# Patient Record
Sex: MALE | Race: WHITE | Employment: FULL TIME | URBAN - METROPOLITAN AREA
[De-identification: names, ages, dates, MRNs, and addresses within clinical notes are randomized per-mention and may not be internally consistent; named-entity substitution may affect disease eponyms.]

---

## 2017-05-04 ENCOUNTER — NURSE ONLY (OUTPATIENT)
Dept: FAMILY MEDICINE CLINIC | Age: 52
End: 2017-05-04

## 2017-05-04 DIAGNOSIS — K21.9 GASTROESOPHAGEAL REFLUX DISEASE WITHOUT ESOPHAGITIS: Primary | ICD-10-CM

## 2017-11-09 PROBLEM — M50.20 HERNIATED DISC, CERVICAL: Status: ACTIVE | Noted: 2017-11-09

## 2017-12-27 ENCOUNTER — HOSPITAL ENCOUNTER (OUTPATIENT)
Age: 52
Setting detail: SPECIMEN
Discharge: HOME | End: 2017-12-27
Payer: COMMERCIAL

## 2018-01-02 LAB — SURGICAL PATHOLOGY REPORT: NORMAL

## 2018-01-08 PROBLEM — I65.23 BILATERAL CAROTID ARTERY STENOSIS: Status: ACTIVE | Noted: 2018-01-08

## 2020-07-02 ENCOUNTER — HOSPITAL ENCOUNTER (OUTPATIENT)
Age: 55
Setting detail: SPECIMEN
Discharge: HOME | End: 2020-07-02
Payer: COMMERCIAL

## 2020-07-02 LAB
ABSOLUTE EOS #: 0.15 K/UL (ref 0–0.44)
ABSOLUTE IMMATURE GRANULOCYTE: <0.03 K/UL (ref 0–0.3)
ABSOLUTE LYMPH #: 1.83 K/UL (ref 1.1–3.7)
ABSOLUTE MONO #: 0.67 K/UL (ref 0.1–1.2)
ALT SERPL-CCNC: 23 U/L (ref 5–41)
ANION GAP SERPL CALCULATED.3IONS-SCNC: 12 MMOL/L (ref 9–17)
AST SERPL-CCNC: 17 U/L
BASOPHILS # BLD: 1 % (ref 0–2)
BASOPHILS ABSOLUTE: 0.05 K/UL (ref 0–0.2)
BUN BLDV-MCNC: 18 MG/DL (ref 6–20)
BUN/CREAT BLD: NORMAL (ref 9–20)
CALCIUM SERPL-MCNC: 9.4 MG/DL (ref 8.6–10.4)
CHLORIDE BLD-SCNC: 106 MMOL/L (ref 98–107)
CHOLESTEROL/HDL RATIO: 3.5
CHOLESTEROL: 187 MG/DL
CO2: 26 MMOL/L (ref 20–31)
CREAT SERPL-MCNC: 1.02 MG/DL (ref 0.7–1.2)
DIFFERENTIAL TYPE: NORMAL
EOSINOPHILS RELATIVE PERCENT: 3 % (ref 1–4)
ESTRADIOL LEVEL: 22 PG/ML (ref 27–52)
GFR AFRICAN AMERICAN: >60 ML/MIN
GFR NON-AFRICAN AMERICAN: >60 ML/MIN
GFR SERPL CREATININE-BSD FRML MDRD: NORMAL ML/MIN/{1.73_M2}
GFR SERPL CREATININE-BSD FRML MDRD: NORMAL ML/MIN/{1.73_M2}
GLUCOSE FASTING: 96 MG/DL (ref 70–99)
HCT VFR BLD CALC: 45.8 % (ref 40.7–50.3)
HDLC SERPL-MCNC: 53 MG/DL
HEMOGLOBIN: 15.1 G/DL (ref 13–17)
IMMATURE GRANULOCYTES: 0 %
LDL CHOLESTEROL: 117 MG/DL (ref 0–130)
LYMPHOCYTES # BLD: 32 % (ref 24–43)
MCH RBC QN AUTO: 31.7 PG (ref 25.2–33.5)
MCHC RBC AUTO-ENTMCNC: 33 G/DL (ref 28.4–34.8)
MCV RBC AUTO: 96 FL (ref 82.6–102.9)
MONOCYTES # BLD: 12 % (ref 3–12)
NRBC AUTOMATED: 0 PER 100 WBC
PDW BLD-RTO: 12.7 % (ref 11.8–14.4)
PLATELET # BLD: 264 K/UL (ref 138–453)
PLATELET ESTIMATE: NORMAL
PMV BLD AUTO: 11.3 FL (ref 8.1–13.5)
POTASSIUM SERPL-SCNC: 4.5 MMOL/L (ref 3.7–5.3)
PROSTATE SPECIFIC ANTIGEN: 0.61 UG/L
RBC # BLD: 4.77 M/UL (ref 4.21–5.77)
RBC # BLD: NORMAL 10*6/UL
SEG NEUTROPHILS: 52 % (ref 36–65)
SEGMENTED NEUTROPHILS ABSOLUTE COUNT: 3.03 K/UL (ref 1.5–8.1)
SEX HORMONE BINDING GLOBULIN: 36 NMOL/L (ref 11–80)
SODIUM BLD-SCNC: 144 MMOL/L (ref 135–144)
TESTOSTERONE FREE-NONMALE: 117.8 PG/ML (ref 47–244)
TESTOSTERONE TOTAL: 577 NG/DL (ref 220–1000)
TRIGL SERPL-MCNC: 87 MG/DL
TSH SERPL DL<=0.05 MIU/L-ACNC: 1.33 MIU/L (ref 0.3–5)
VITAMIN D 25-HYDROXY: 50.9 NG/ML (ref 30–100)
VLDLC SERPL CALC-MCNC: NORMAL MG/DL (ref 1–30)
WBC # BLD: 5.8 K/UL (ref 3.5–11.3)
WBC # BLD: NORMAL 10*3/UL

## 2021-07-13 ENCOUNTER — HOSPITAL ENCOUNTER (INPATIENT)
Age: 56
LOS: 3 days | Discharge: HOME | DRG: 310 | End: 2021-07-16
Attending: EMERGENCY MEDICINE | Admitting: INTERNAL MEDICINE
Payer: COMMERCIAL

## 2021-07-13 ENCOUNTER — APPOINTMENT (OUTPATIENT)
Dept: GENERAL RADIOLOGY | Age: 56
DRG: 310 | End: 2021-07-13
Payer: COMMERCIAL

## 2021-07-13 DIAGNOSIS — I48.92 ATRIAL FLUTTER, UNSPECIFIED TYPE (HCC): Primary | ICD-10-CM

## 2021-07-13 LAB
-: NORMAL
ABSOLUTE EOS #: 0.15 K/UL (ref 0–0.44)
ABSOLUTE IMMATURE GRANULOCYTE: <0.03 K/UL (ref 0–0.3)
ABSOLUTE LYMPH #: 2.18 K/UL (ref 1.1–3.7)
ABSOLUTE MONO #: 0.49 K/UL (ref 0.1–1.2)
ALBUMIN SERPL-MCNC: 4.1 G/DL (ref 3.5–5.2)
ALBUMIN/GLOBULIN RATIO: 1.5 (ref 1–2.5)
ALP BLD-CCNC: 52 U/L (ref 40–129)
ALT SERPL-CCNC: 28 U/L (ref 5–41)
ANION GAP SERPL CALCULATED.3IONS-SCNC: 13 MMOL/L (ref 9–17)
AST SERPL-CCNC: 20 U/L
BASOPHILS # BLD: 1 % (ref 0–2)
BASOPHILS ABSOLUTE: 0.03 K/UL (ref 0–0.2)
BILIRUB SERPL-MCNC: 0.34 MG/DL (ref 0.3–1.2)
BNP INTERPRETATION: ABNORMAL
BUN BLDV-MCNC: 16 MG/DL (ref 6–20)
BUN/CREAT BLD: NORMAL (ref 9–20)
CALCIUM SERPL-MCNC: 8.9 MG/DL (ref 8.6–10.4)
CHLORIDE BLD-SCNC: 105 MMOL/L (ref 98–107)
CO2: 23 MMOL/L (ref 20–31)
CREAT SERPL-MCNC: 0.99 MG/DL (ref 0.7–1.2)
D-DIMER QUANTITATIVE: 0.17 MG/L FEU
DIFFERENTIAL TYPE: NORMAL
EOSINOPHILS RELATIVE PERCENT: 3 % (ref 1–4)
GFR AFRICAN AMERICAN: >60 ML/MIN
GFR NON-AFRICAN AMERICAN: >60 ML/MIN
GFR SERPL CREATININE-BSD FRML MDRD: NORMAL ML/MIN/{1.73_M2}
GFR SERPL CREATININE-BSD FRML MDRD: NORMAL ML/MIN/{1.73_M2}
GLUCOSE BLD-MCNC: 95 MG/DL (ref 70–99)
HCT VFR BLD CALC: 45.9 % (ref 40.7–50.3)
HEMOGLOBIN: 15.1 G/DL (ref 13–17)
IMMATURE GRANULOCYTES: 0 %
LYMPHOCYTES # BLD: 36 % (ref 24–43)
MAGNESIUM: 2.2 MG/DL (ref 1.6–2.6)
MCH RBC QN AUTO: 32 PG (ref 25.2–33.5)
MCHC RBC AUTO-ENTMCNC: 32.9 G/DL (ref 28.4–34.8)
MCV RBC AUTO: 97.2 FL (ref 82.6–102.9)
MONOCYTES # BLD: 8 % (ref 3–12)
NRBC AUTOMATED: 0 PER 100 WBC
PARTIAL THROMBOPLASTIN TIME: 24.8 SEC (ref 20.5–30.5)
PDW BLD-RTO: 12.7 % (ref 11.8–14.4)
PHOSPHORUS: 3.5 MG/DL (ref 2.5–4.5)
PLATELET # BLD: NORMAL K/UL (ref 138–453)
PLATELET ESTIMATE: NORMAL
PLATELET, FLUORESCENCE: 159 K/UL (ref 138–453)
PLATELET, IMMATURE FRACTION: 6.8 % (ref 1.1–10.3)
PMV BLD AUTO: NORMAL FL (ref 8.1–13.5)
POTASSIUM SERPL-SCNC: 4.3 MMOL/L (ref 3.7–5.3)
PRO-BNP: 401 PG/ML
RBC # BLD: 4.72 M/UL (ref 4.21–5.77)
RBC # BLD: NORMAL 10*6/UL
REASON FOR REJECTION: NORMAL
SEG NEUTROPHILS: 53 % (ref 36–65)
SEGMENTED NEUTROPHILS ABSOLUTE COUNT: 3.17 K/UL (ref 1.5–8.1)
SODIUM BLD-SCNC: 141 MMOL/L (ref 135–144)
TOTAL PROTEIN: 6.8 G/DL (ref 6.4–8.3)
TROPONIN INTERP: NORMAL
TROPONIN T: NORMAL NG/ML
TROPONIN, HIGH SENSITIVITY: <6 NG/L (ref 0–22)
TSH SERPL DL<=0.05 MIU/L-ACNC: 0.82 MIU/L (ref 0.3–5)
WBC # BLD: 6 K/UL (ref 3.5–11.3)
WBC # BLD: NORMAL 10*3/UL
ZZ NTE CLEAN UP: ORDERED TEST: NORMAL
ZZ NTE WITH NAME CLEAN UP: SPECIMEN SOURCE: NORMAL

## 2021-07-13 PROCEDURE — 85379 FIBRIN DEGRADATION QUANT: CPT

## 2021-07-13 PROCEDURE — 84484 ASSAY OF TROPONIN QUANT: CPT

## 2021-07-13 PROCEDURE — 85055 RETICULATED PLATELET ASSAY: CPT

## 2021-07-13 PROCEDURE — 85730 THROMBOPLASTIN TIME PARTIAL: CPT

## 2021-07-13 PROCEDURE — 1200000000 HC SEMI PRIVATE

## 2021-07-13 PROCEDURE — 83880 ASSAY OF NATRIURETIC PEPTIDE: CPT

## 2021-07-13 PROCEDURE — 80053 COMPREHEN METABOLIC PANEL: CPT

## 2021-07-13 PROCEDURE — 6360000002 HC RX W HCPCS: Performed by: STUDENT IN AN ORGANIZED HEALTH CARE EDUCATION/TRAINING PROGRAM

## 2021-07-13 PROCEDURE — 85025 COMPLETE CBC W/AUTO DIFF WBC: CPT

## 2021-07-13 PROCEDURE — 71046 X-RAY EXAM CHEST 2 VIEWS: CPT

## 2021-07-13 PROCEDURE — 6370000000 HC RX 637 (ALT 250 FOR IP): Performed by: STUDENT IN AN ORGANIZED HEALTH CARE EDUCATION/TRAINING PROGRAM

## 2021-07-13 PROCEDURE — 99223 1ST HOSP IP/OBS HIGH 75: CPT | Performed by: INTERNAL MEDICINE

## 2021-07-13 PROCEDURE — 99284 EMERGENCY DEPT VISIT MOD MDM: CPT

## 2021-07-13 PROCEDURE — 93005 ELECTROCARDIOGRAM TRACING: CPT | Performed by: EMERGENCY MEDICINE

## 2021-07-13 PROCEDURE — 84443 ASSAY THYROID STIM HORMONE: CPT

## 2021-07-13 PROCEDURE — 83735 ASSAY OF MAGNESIUM: CPT

## 2021-07-13 PROCEDURE — 2580000003 HC RX 258: Performed by: STUDENT IN AN ORGANIZED HEALTH CARE EDUCATION/TRAINING PROGRAM

## 2021-07-13 PROCEDURE — 84100 ASSAY OF PHOSPHORUS: CPT

## 2021-07-13 RX ORDER — BUDESONIDE AND FORMOTEROL FUMARATE DIHYDRATE 80; 4.5 UG/1; UG/1
2 AEROSOL RESPIRATORY (INHALATION) 2 TIMES DAILY
Status: DISCONTINUED | OUTPATIENT
Start: 2021-07-13 | End: 2021-07-16 | Stop reason: HOSPADM

## 2021-07-13 RX ORDER — POTASSIUM CHLORIDE 7.45 MG/ML
10 INJECTION INTRAVENOUS PRN
Status: DISCONTINUED | OUTPATIENT
Start: 2021-07-13 | End: 2021-07-16 | Stop reason: HOSPADM

## 2021-07-13 RX ORDER — SODIUM CHLORIDE 9 MG/ML
INJECTION, SOLUTION INTRAVENOUS CONTINUOUS
Status: ACTIVE | OUTPATIENT
Start: 2021-07-13 | End: 2021-07-13

## 2021-07-13 RX ORDER — ACETAMINOPHEN 650 MG/1
650 SUPPOSITORY RECTAL EVERY 6 HOURS PRN
Status: DISCONTINUED | OUTPATIENT
Start: 2021-07-13 | End: 2021-07-16 | Stop reason: HOSPADM

## 2021-07-13 RX ORDER — SODIUM CHLORIDE 9 MG/ML
25 INJECTION, SOLUTION INTRAVENOUS PRN
Status: DISCONTINUED | OUTPATIENT
Start: 2021-07-13 | End: 2021-07-16 | Stop reason: HOSPADM

## 2021-07-13 RX ORDER — ASPIRIN 81 MG/1
324 TABLET, CHEWABLE ORAL ONCE
Status: COMPLETED | OUTPATIENT
Start: 2021-07-13 | End: 2021-07-13

## 2021-07-13 RX ORDER — ONDANSETRON 2 MG/ML
4 INJECTION INTRAMUSCULAR; INTRAVENOUS EVERY 6 HOURS PRN
Status: DISCONTINUED | OUTPATIENT
Start: 2021-07-13 | End: 2021-07-16 | Stop reason: HOSPADM

## 2021-07-13 RX ORDER — POTASSIUM CHLORIDE 20 MEQ/1
40 TABLET, EXTENDED RELEASE ORAL PRN
Status: DISCONTINUED | OUTPATIENT
Start: 2021-07-13 | End: 2021-07-16 | Stop reason: HOSPADM

## 2021-07-13 RX ORDER — ACETAMINOPHEN 325 MG/1
650 TABLET ORAL EVERY 6 HOURS PRN
Status: DISCONTINUED | OUTPATIENT
Start: 2021-07-13 | End: 2021-07-16 | Stop reason: HOSPADM

## 2021-07-13 RX ORDER — SODIUM CHLORIDE 0.9 % (FLUSH) 0.9 %
5-40 SYRINGE (ML) INJECTION PRN
Status: DISCONTINUED | OUTPATIENT
Start: 2021-07-13 | End: 2021-07-16 | Stop reason: HOSPADM

## 2021-07-13 RX ORDER — HEPARIN SODIUM 1000 [USP'U]/ML
4000 INJECTION, SOLUTION INTRAVENOUS; SUBCUTANEOUS PRN
Status: DISCONTINUED | OUTPATIENT
Start: 2021-07-13 | End: 2021-07-16

## 2021-07-13 RX ORDER — ONDANSETRON 4 MG/1
4 TABLET, ORALLY DISINTEGRATING ORAL EVERY 8 HOURS PRN
Status: DISCONTINUED | OUTPATIENT
Start: 2021-07-13 | End: 2021-07-16 | Stop reason: HOSPADM

## 2021-07-13 RX ORDER — HEPARIN SODIUM 1000 [USP'U]/ML
2000 INJECTION, SOLUTION INTRAVENOUS; SUBCUTANEOUS PRN
Status: DISCONTINUED | OUTPATIENT
Start: 2021-07-13 | End: 2021-07-16

## 2021-07-13 RX ORDER — POLYETHYLENE GLYCOL 3350 17 G/17G
17 POWDER, FOR SOLUTION ORAL DAILY PRN
Status: DISCONTINUED | OUTPATIENT
Start: 2021-07-13 | End: 2021-07-16 | Stop reason: HOSPADM

## 2021-07-13 RX ORDER — HEPARIN SODIUM 10000 [USP'U]/100ML
5-30 INJECTION, SOLUTION INTRAVENOUS CONTINUOUS
Status: DISCONTINUED | OUTPATIENT
Start: 2021-07-13 | End: 2021-07-16

## 2021-07-13 RX ORDER — SODIUM CHLORIDE 0.9 % (FLUSH) 0.9 %
5-40 SYRINGE (ML) INJECTION EVERY 12 HOURS SCHEDULED
Status: DISCONTINUED | OUTPATIENT
Start: 2021-07-13 | End: 2021-07-16 | Stop reason: HOSPADM

## 2021-07-13 RX ORDER — METOPROLOL TARTRATE 5 MG/5ML
5 INJECTION INTRAVENOUS EVERY 6 HOURS PRN
Status: DISCONTINUED | OUTPATIENT
Start: 2021-07-13 | End: 2021-07-16 | Stop reason: HOSPADM

## 2021-07-13 RX ORDER — HEPARIN SODIUM 1000 [USP'U]/ML
4000 INJECTION, SOLUTION INTRAVENOUS; SUBCUTANEOUS ONCE
Status: COMPLETED | OUTPATIENT
Start: 2021-07-13 | End: 2021-07-13

## 2021-07-13 RX ADMIN — APIXABAN 5 MG: 5 TABLET, FILM COATED ORAL at 16:26

## 2021-07-13 RX ADMIN — ASPIRIN 324 MG: 81 TABLET, CHEWABLE ORAL at 13:17

## 2021-07-13 RX ADMIN — SODIUM CHLORIDE, PRESERVATIVE FREE 10 ML: 5 INJECTION INTRAVENOUS at 22:39

## 2021-07-13 RX ADMIN — HEPARIN SODIUM AND DEXTROSE 10.4 UNITS/KG/HR: 10000; 5 INJECTION INTRAVENOUS at 20:02

## 2021-07-13 RX ADMIN — HEPARIN SODIUM 4000 UNITS: 1000 INJECTION INTRAVENOUS; SUBCUTANEOUS at 20:01

## 2021-07-13 ASSESSMENT — ENCOUNTER SYMPTOMS
VOMITING: 0
ABDOMINAL PAIN: 0
CHEST TIGHTNESS: 0
SHORTNESS OF BREATH: 1
ABDOMINAL DISTENTION: 0
SORE THROAT: 0
BACK PAIN: 0
SHORTNESS OF BREATH: 0
NAUSEA: 0
RHINORRHEA: 0
EYE PAIN: 0
EYE DISCHARGE: 0

## 2021-07-13 ASSESSMENT — PAIN SCALES - GENERAL: PAINLEVEL_OUTOF10: 0

## 2021-07-13 NOTE — ED NOTES
Bed: 33  Expected date:   Expected time:   Means of arrival:   Comments:     Michelle Valdez RN  07/13/21 3500

## 2021-07-13 NOTE — ED PROVIDER NOTES
[facility] ED  Emergency Department Encounter  EmergencyMedicine Resident     Pt Name:Chin Jacinto  MRN: [id number]  Birthdate 1965  Date of evaluation: 7/13/21  PCP:  Camilla Salazar MD    This patient was evaluated in the Emergency Department for symptoms described in the history of present illness. The patient was evaluated in the context of the global COVID-19 pandemic, which necessitated consideration that the patient might be at risk for infection with the SARS-CoV-2 virus that causes COVID-19. Institutional protocols and algorithms that pertain to the evaluation of patients at risk for COVID-19 are in a state of rapid change based on information released by regulatory bodies including the CDC and federal and state organizations. These policies and algorithms were followed during the patient's care in the ED. CHIEF COMPLAINT       Chief Complaint   Patient presents with    Fatigue       HISTORY OF PRESENT ILLNESS  (Location/Symptom, Timing/Onset, Context/Setting, Quality, Duration, Modifying Factors, Severity.)      Darshan Keyes is a 60 y.o. male who presents with his cardiologist office being found to have a flutter. Patient states that he has been having symptoms like this for several months since the beginning of May. Patient has had dyspnea on exertion and generalized fatigue. Denies any acute chest pain or shortness of breath no leg swelling. No stimulant use no alcohol withdrawal.    PAST MEDICAL / SURGICAL / SOCIAL / FAMILY HISTORY      has no past medical history on file. Bilateral carotid artery stenosis otitis media, tendinitis of the shoulder, cervical herniated disc.     has a past surgical history that includes Neck surgery; knee surgery; Hydrocele surgery; Mccurtain tooth extraction; and Colonoscopy.     Social History     Socioeconomic History    Marital status:      Spouse name: Not on file    Number of children: Not on file    Years of education: Not on file    Highest education level: Not on file   Occupational History    Not on file   Tobacco Use    Smoking status: Never Smoker    Smokeless tobacco: Never Used   Substance and Sexual Activity    Alcohol use: Yes     Comment: socially    Drug use: Not on file    Sexual activity: Not on file   Other Topics Concern    Not on file   Social History Narrative    Not on file     Social Determinants of Health     Financial Resource Strain: Low Risk     Difficulty of Paying Living Expenses: Not hard at all   Food Insecurity: No Food Insecurity    Worried About Running Out of Food in the Last Year: Never true    [address] in the Last Year: Never true   Transportation Needs:     Lack of Transportation (Medical):  Lack of Transportation (Non-Medical):    Physical Activity:     Days of Exercise per Week:     Minutes of Exercise per Session:    Stress:     Feeling of Stress :    Social Connections:     Frequency of Communication with Friends and Family:     Frequency of Social Gatherings with Friends and Family:     Attends [employer] Services:     Active Member of Clubs or Organizations:     Attends Club or Organization Meetings:     Marital Status:    Intimate Partner Violence:     Fear of Current or Ex-Partner:     Emotionally Abused:     Physically Abused:     Sexually Abused:        No family history on file. Allergies:  Levaquin [levofloxacin in d5w]    Home Medications:  Prior to Admission medications    Medication Sig Start Date End Date Taking?  Authorizing Provider   fluticasone-salmeterol (ADVAIR) 250-50 MCG/DOSE AEPB Inhale 1 puff into the lungs every 12 hours 6/7/21   Omar Burns MD   amoxicillin-clavulanate (AUGMENTIN) 875-125 MG per tablet Take 1 tablet by mouth 2 times daily 3/29/21   Omar Burns MD   Multiple Vitamins-Minerals (THERAPEUTIC MULTIVITAMIN-MINERALS) tablet Take 1 tablet by mouth daily    Historical Provider, MD       REVIEW OF SYSTEMS    (2-9 systems for level 4, 10 or more for level 5)      Review of Systems   Constitutional: Positive for fatigue. Negative for fever. HENT: Negative for drooling, rhinorrhea and sore throat. Respiratory: Negative for shortness of breath. Cardiovascular: Positive for palpitations. Negative for chest pain and leg swelling. Gastrointestinal: Negative for abdominal pain, nausea and vomiting. Genitourinary: Negative for difficulty urinating and dysuria. Musculoskeletal: Negative for back pain, gait problem and neck pain. Skin: Negative for pallor. Neurological: Negative for dizziness and syncope. Hematological: Does not bruise/bleed easily. Psychiatric/Behavioral: Negative for agitation and confusion. PHYSICAL EXAM   (up to 7 for level 4, 8 or more for level 5)      INITIAL VITALS:   BP (!) 127/91   Pulse 87   Temp 98.4 °F (36.9 °C) (Oral)   Resp 16   SpO2 98%     Physical Exam  Vitals and nursing note reviewed. Constitutional:       Appearance: Normal appearance. He is normal weight. He is not toxic-appearing. HENT:      Head: Normocephalic and atraumatic. Right Ear: External ear normal.      Left Ear: External ear normal.      Nose: Nose normal.      Mouth/Throat:      Pharynx: Oropharynx is clear. Eyes:      Conjunctiva/sclera: Conjunctivae normal.   Cardiovascular:      Rate and Rhythm: Tachycardia present. Rhythm irregular. Pulses: Normal pulses. Pulmonary:      Effort: Pulmonary effort is normal.   Abdominal:      Palpations: Abdomen is soft. Tenderness: There is no abdominal tenderness. There is no guarding. Musculoskeletal:         General: Normal range of motion. Cervical back: Normal range of motion. Right lower leg: No edema. Left lower leg: No edema. Skin:     General: Skin is warm. Capillary Refill: Capillary refill takes less than 2 seconds. Findings: No rash.    Neurological:      Mental Status: He is alert and oriented to person, place, and time.    Psychiatric:         Mood and Affect: Mood normal.         DIFFERENTIAL  DIAGNOSIS     PLAN (LABS / IMAGING / EKG):  Orders Placed This Encounter   Procedures    XR CHEST (2 VW)    CBC WITH AUTO DIFFERENTIAL    Immature Platelet Fraction    SPECIMEN REJECTION    Brain Natriuretic Peptide    Comprehensive Metabolic Panel    Magnesium    Phosphorus    PREVIOUS SPECIMEN    Troponin    TSH with Reflex    D-DIMER, QUANTITATIVE    Remote cardiac monitor    Inpatient consult to Internal Medicine    Inpatient consult to Cardiology    PATIENT STATUS (FROM ED OR OR/PROCEDURAL) Inpatient       MEDICATIONS ORDERED:  Orders Placed This Encounter   Medications    aspirin chewable tablet 324 mg    apixaban (ELIQUIS) tablet 5 mg    metoprolol (LOPRESSOR) injection 5 mg       DDX: arrythmia, acs, thyroid abnormality, electrolyte derrangement, dehydration    DIAGNOSTIC RESULTS / EMERGENCY DEPARTMENT COURSE / MDM   LAB RESULTS:  Results for orders placed or performed during the hospital encounter of 07/13/21   CBC WITH AUTO DIFFERENTIAL   Result Value Ref Range    WBC 6.0 3.5 - 11.3 k/uL    RBC 4.72 4.21 - 5.77 m/uL    Hemoglobin 15.1 13.0 - 17.0 g/dL    Hematocrit 45.9 40.7 - 50.3 %    MCV 97.2 82.6 - 102.9 fL    MCH 32.0 25.2 - 33.5 pg    MCHC 32.9 28.4 - 34.8 g/dL    RDW 12.7 11.8 - 14.4 %    Platelets See Reflexed IPF Result 138 - 453 k/uL    MPV NOT REPORTED 8.1 - 13.5 fL    NRBC Automated 0.0 0.0 per 100 WBC    Differential Type NOT REPORTED     WBC Morphology NOT REPORTED     RBC Morphology NOT REPORTED     Platelet Estimate NOT REPORTED     Seg Neutrophils 53 36 - 65 %    Lymphocytes 36 24 - 43 %    Monocytes 8 3 - 12 %    Eosinophils % 3 1 - 4 %    Basophils 1 0 - 2 %    Immature Granulocytes 0 0 %    Segs Absolute 3.17 1.50 - 8.10 k/uL    Absolute Lymph # 2.18 1.10 - 3.70 k/uL    Absolute Mono # 0.49 0.10 - 1.20 k/uL    Absolute Eos # 0.15 0.00 - 0.44 k/uL    Basophils Absolute 0.03 0.00 - 0.20 k/uL    Absolute Immature Granulocyte <0.03 0.00 - 0.30 k/uL   Immature Platelet Fraction   Result Value Ref Range    Platelet, Immature Fraction 6.8 1.1 - 10.3 %    Platelet, Fluorescence 159 138 - 453 k/uL   SPECIMEN REJECTION   Result Value Ref Range    Specimen Source . BLOOD     Ordered Test BNP CP MG EVANS TROPI TSHX     Reason for Rejection Unable to perform testing: Specimen hemolyzed.      - NOT REPORTED    Brain Natriuretic Peptide   Result Value Ref Range    Pro- (H) <300 pg/mL    BNP Interpretation Pro-BNP Reference Range:    Comprehensive Metabolic Panel   Result Value Ref Range    Glucose 95 70 - 99 mg/dL    BUN 16 6 - 20 mg/dL    CREATININE 0.99 0.70 - 1.20 mg/dL    Bun/Cre Ratio NOT REPORTED 9 - 20    Calcium 8.9 8.6 - 10.4 mg/dL    Sodium 141 135 - 144 mmol/L    Potassium 4.3 3.7 - 5.3 mmol/L    Chloride 105 98 - 107 mmol/L    CO2 23 20 - 31 mmol/L    Anion Gap 13 9 - 17 mmol/L    Alkaline Phosphatase 52 40 - 129 U/L    ALT 28 5 - 41 U/L    AST 20 <40 U/L    Total Bilirubin 0.34 0.3 - 1.2 mg/dL    Total Protein 6.8 6.4 - 8.3 g/dL    Albumin 4.1 3.5 - 5.2 g/dL    Albumin/Globulin Ratio 1.5 1.0 - 2.5    GFR Non-African American >60 >60 mL/min    GFR African American >60 >60 mL/min    GFR Comment          GFR Staging NOT REPORTED    Magnesium   Result Value Ref Range    Magnesium 2.2 1.6 - 2.6 mg/dL   Phosphorus   Result Value Ref Range    Phosphorus 3.5 2.5 - 4.5 mg/dL   Troponin   Result Value Ref Range    Troponin, High Sensitivity <6 0 - 22 ng/L    Troponin T NOT REPORTED <0.03 ng/mL    Troponin Interp NOT REPORTED    TSH with Reflex   Result Value Ref Range    TSH 0.82 0.30 - 5.00 mIU/L   D-DIMER, QUANTITATIVE   Result Value Ref Range    D-Dimer, Quant 0.17 mg/L FEU       IMPRESSION: Patient is alert oriented nontoxic 61-year-old male in no acute or acute distress speaking full sentences with equal chest rise lungs are clear to auscultation he is tachycardic with an irregular rhythm concerning for atrial fibrillation versus atrial flutter versus multifocal atrial tachycardia. Plan will be cardiac work-up labs imaging anticipate admission. Prior ECG from earlier today at 11 AM shows what appears to be atrial flutter    RADIOLOGY:  XR CHEST (2 VW)    Result Date: 7/13/2021  EXAMINATION: TWO XRAY VIEWS OF THE CHEST 7/13/2021 1:04 pm COMPARISON: PA and lateral chest May 4, 2017. HISTORY: ORDERING SYSTEM PROVIDED HISTORY: palpitations TECHNOLOGIST PROVIDED HISTORY: palpitations Acuity: Unknown Type of Exam: Unknown FINDINGS: Considering mild hypoinflation, the heart is normal in size. No evidence of pneumothorax, pleural effusion, infiltrate, or abnormal lung mass. There is mild eventration of the anterior portion of the right hemidiaphragm. There are prominent spondylitic changes throughout the thoracic spine. Stigmata of previous lower cervical spine surgery. Degenerative bony changes. No evidence of acute cardiopulmonary process. EKG  Atrial flutter with variable AV block rate of 94 QRS duration 110 . No acute ST-T wave changes. All EKG's are interpreted by the Emergency Department Physician who either signs or Co-signs this chart in the absence of a cardiologist.    EMERGENCY DEPARTMENT COURSE:  Patient was seen and evaluated initially mildly tachycardic at rate of 107 in atrial flutter flutter with variable block self terminated. Labs and imaging work-up unremarkable for any acute process patient admitted after starting anticoagulation. PROCEDURES:  none    CONSULTS:  IP CONSULT TO INTERNAL MEDICINE  IP CONSULT TO CARDIOLOGY  IP CONSULT TO CASE MANAGEMENT    CRITICAL CARE:  none    FINAL IMPRESSION      1. Atrial flutter, unspecified type ([address]          DISPOSITION / PLAN     DISPOSITION  admitted      PATIENT REFERRED TO:  No follow-up provider specified.     DISCHARGE MEDICATIONS:  New Prescriptions    No medications on file       Olga Merritt DO  Emergency Medicine Resident    (Please note that portions of thisnote were completed with a voice recognition program.  Efforts were made to edit the dictations but occasionally words are mis-transcribed.)         Brunilda Granger DO  Resident  07/13/21 1506

## 2021-07-13 NOTE — ED TRIAGE NOTES
Pt reports feeling fatigued since receiving second dose of covid vaccine. Pt states that yesterday he felt that his heart was racing. Pt saw his cardiologist this morning and was told to come here. Pt denies SOB and chest pain, but states that he still feels fatigued. Pt has no other complaints at this time.

## 2021-07-13 NOTE — H&P
[address]     Department of Internal Medicine - Staff Internal Medicine Teaching Service          ADMISSION NOTE/HISTORY AND PHYSICAL EXAMINATION   Date: 7/13/2021  Patient Name: Abel Oquendo  Date of admission: 7/13/2021 12:46 PM  YOB: 1965  PCP: Carmen Madden MD  History Obtained From:  patient    CHIEF COMPLAINT     Chief complaint: shortness of breath    HISTORY OF PRESENTING ILLNESS     The patient is a pleasant 60 y.o. male presents with PMH of exercise induced asthma a chief complaint of shortness of breath with exertion. He went to PCP who referred him to cardiologist. At the cardiology clinic, He was found to have HR of 130, and was referred to ED. In the ED, he was found to have  --> 90's, controlled on it's own. Patient stated his shortness of breath started after Moderna vaccine on may 1st. His symptoms are also associated with early fatigue  Labs showed pro-bnp 401. CXR was normal.   Patient HR control on it's own for now, on lopressor 5 mg if HR > 100. Chadvasc score of 0. Patient on heparin. Review of Systems:  Review of Systems   Constitutional: Positive for fatigue. Negative for appetite change. HENT: Negative for congestion and sore throat. Eyes: Negative for pain and discharge. Respiratory: Positive for shortness of breath. Negative for chest tightness. Cardiovascular: Negative for chest pain. Gastrointestinal: Negative for abdominal distention and abdominal pain. Genitourinary: Negative for dysuria and flank pain. Musculoskeletal: Negative for arthralgias. Neurological: Negative for syncope and numbness. PAST MEDICAL HISTORY     History reviewed. No pertinent past medical history.     PAST SURGICAL HISTORY     Past Surgical History:   Procedure Laterality Date    COLONOSCOPY      HYDROCELE EXCISION      KNEE SURGERY      NECK SURGERY      WISDOM TOOTH EXTRACTION         ALLERGIES     Levaquin [levofloxacin in d5w]    MEDICATIONS PRIOR TO ADMISSION     Prior to Admission medications    Medication Sig Start Date End Date Taking? Authorizing Provider   fluticasone-salmeterol (ADVAIR) 250-50 MCG/DOSE AEPB Inhale 1 puff into the lungs every 12 hours 21   Jaylin Hassan MD   amoxicillin-clavulanate (AUGMENTIN) 875-125 MG per tablet Take 1 tablet by mouth 2 times daily 3/29/21   Jaylin Hassan MD   Multiple Vitamins-Minerals (THERAPEUTIC MULTIVITAMIN-MINERALS) tablet Take 1 tablet by mouth daily    Historical Provider, MD       SOCIAL HISTORY     Tobacco: none  Alcohol: beer occasionally  Illicits: none  Occupation:     FAMILY HISTORY     History reviewed. No pertinent family history. PHYSICAL EXAM     Vitals: BP (!) 127/91   Pulse 87   Temp 98.4 °F (36.9 °C) (Oral)   Resp 16   SpO2 98%   Tmax: Temp (24hrs), Av.4 °F (36.9 °C), Min:98.4 °F (36.9 °C), Max:98.4 °F (36.9 °C)    Last Body weight:   Wt Readings from Last 3 Encounters:   21 209 lb (94.8 kg)   21 213 lb (96.6 kg)   21 221 lb (100.2 kg)     Body Mass Index : There is no height or weight on file to calculate BMI. PHYSICAL EXAMINATION:  Constitutional: This is a well developed, well nourished, 25-29.9 - Overweight 60y.o. year old male who is alert, oriented, cooperative and in no apparent distress. Head:normocephalic and atraumatic. EENT:  PERRLA. No conjunctival injections. Septum was midline, mucosa was without erythema, exudates or cobblestoning. No thrush was noted. Neck: Supple without thyromegaly. No elevated JVP. Trachea was midline. Respiratory: Chest was symmetrical without dullness to percussion. Breath sounds bilaterally were clear to auscultation. There were no wheezes, rhonchi or rales. There is no intercostal retraction or use of accessory muscles. No egophony noted. Cardiovascular: Regular without murmur, clicks, gallops or rubs. Abdomen: Slightly rounded and soft without organomegaly.  No rebound, rigidity or guarding was appreciated. Lymphatic: No lymphadenopathy. Musculoskeletal: Normal curvature of the spine. No gross muscle weakness. Extremities:  No lower extremity edema, ulcerations, tenderness, varicosities or erythema. Muscle size, tone and strength are normal.  No involuntary movements are noted. Skin:  Warm and dry. Good color, turgor and pigmentation. No lesions or scars.   No cyanosis or clubbing  Neurological/Psychiatric: The patient's general behavior, level of consciousness, thought content and emotional status is normal.          INVESTIGATIONS     Laboratory Testing:     Recent Results (from the past 24 hour(s))   CBC WITH AUTO DIFFERENTIAL    Collection Time: 07/13/21  1:08 PM   Result Value Ref Range    WBC 6.0 3.5 - 11.3 k/uL    RBC 4.72 4.21 - 5.77 m/uL    Hemoglobin 15.1 13.0 - 17.0 g/dL    Hematocrit 45.9 40.7 - 50.3 %    MCV 97.2 82.6 - 102.9 fL    MCH 32.0 25.2 - 33.5 pg    MCHC 32.9 28.4 - 34.8 g/dL    RDW 12.7 11.8 - 14.4 %    Platelets See Reflexed IPF Result 138 - 453 k/uL    MPV NOT REPORTED 8.1 - 13.5 fL    NRBC Automated 0.0 0.0 per 100 WBC    Differential Type NOT REPORTED     WBC Morphology NOT REPORTED     RBC Morphology NOT REPORTED     Platelet Estimate NOT REPORTED     Seg Neutrophils 53 36 - 65 %    Lymphocytes 36 24 - 43 %    Monocytes 8 3 - 12 %    Eosinophils % 3 1 - 4 %    Basophils 1 0 - 2 %    Immature Granulocytes 0 0 %    Segs Absolute 3.17 1.50 - 8.10 k/uL    Absolute Lymph # 2.18 1.10 - 3.70 k/uL    Absolute Mono # 0.49 0.10 - 1.20 k/uL    Absolute Eos # 0.15 0.00 - 0.44 k/uL    Basophils Absolute 0.03 0.00 - 0.20 k/uL    Absolute Immature Granulocyte <0.03 0.00 - 0.30 k/uL   Immature Platelet Fraction    Collection Time: 07/13/21  1:08 PM   Result Value Ref Range    Platelet, Immature Fraction 6.8 1.1 - 10.3 %    Platelet, Fluorescence 159 138 - 453 k/uL   SPECIMEN REJECTION    Collection Time: 07/13/21  1:08 PM   Result Value Ref Range Specimen Source . BLOOD     Ordered Test BNP CP MG EVANS TROPI TSHX     Reason for Rejection Unable to perform testing: Specimen hemolyzed. - NOT REPORTED    Brain Natriuretic Peptide    Collection Time: 07/13/21  2:30 PM   Result Value Ref Range    Pro- (H) <300 pg/mL    BNP Interpretation Pro-BNP Reference Range:    Comprehensive Metabolic Panel    Collection Time: 07/13/21  2:30 PM   Result Value Ref Range    Glucose 95 70 - 99 mg/dL    BUN 16 6 - 20 mg/dL    CREATININE 0.99 0.70 - 1.20 mg/dL    Bun/Cre Ratio NOT REPORTED 9 - 20    Calcium 8.9 8.6 - 10.4 mg/dL    Sodium 141 135 - 144 mmol/L    Potassium 4.3 3.7 - 5.3 mmol/L    Chloride 105 98 - 107 mmol/L    CO2 23 20 - 31 mmol/L    Anion Gap 13 9 - 17 mmol/L    Alkaline Phosphatase 52 40 - 129 U/L    ALT 28 5 - 41 U/L    AST 20 <40 U/L    Total Bilirubin 0.34 0.3 - 1.2 mg/dL    Total Protein 6.8 6.4 - 8.3 g/dL    Albumin 4.1 3.5 - 5.2 g/dL    Albumin/Globulin Ratio 1.5 1.0 - 2.5    GFR Non-African American >60 >60 mL/min    GFR African American >60 >60 mL/min    GFR Comment          GFR Staging NOT REPORTED    Magnesium    Collection Time: 07/13/21  2:30 PM   Result Value Ref Range    Magnesium 2.2 1.6 - 2.6 mg/dL   Phosphorus    Collection Time: 07/13/21  2:30 PM   Result Value Ref Range    Phosphorus 3.5 2.5 - 4.5 mg/dL   Troponin    Collection Time: 07/13/21  2:30 PM   Result Value Ref Range    Troponin, High Sensitivity <6 0 - 22 ng/L    Troponin T NOT REPORTED <0.03 ng/mL    Troponin Interp NOT REPORTED    TSH with Reflex    Collection Time: 07/13/21  2:30 PM   Result Value Ref Range    TSH 0.82 0.30 - 5.00 mIU/L   D-DIMER, QUANTITATIVE    Collection Time: 07/13/21  4:10 PM   Result Value Ref Range    D-Dimer, Quant 0.17 mg/L FEU       Imaging:   XR CHEST (2 VW)    Result Date: 7/13/2021  Degenerative bony changes. No evidence of acute cardiopulmonary process.        ASSESSMENT & PLAN     ASSESSMENT / PLAN:     IMPRESSION  This is a 60 y.o. male who presented with shortness of breath and found to have Atrial flutter. Patient admitted to inpatient status because of elevated heart rate, atrial flutter    Active Problems:  Atrial flutter  Exercise induced asthma    Atrial flutter (HCC)  Plan: cardio consult placed  - HR control on it's own for now, on lopressor 5 mg if HR > 100.   - CHADVASC score 0, labs within normal limit  - CXR negative  - pr bnp elevated 401  - cardio consult placed    Exercise induced asthma  - on symibicort    DVT ppx: heparin  GI ppx:     PT/OT/SW: pt/ot consult  Discharge Planning:  consult    Grayson Worthy MD  Internal Medicine Resident, PGY-1  [facility]; [place], [place]  7/13/2021, 5:43 PM    Attending Physician Statement  I have discussed the care of Unice Galeazzi with the resident team. I have examined the patient myself and taken ros and hpi , including pertinent history and exam findings,  with the resident. I have reviewed the key elements of all parts of the encounter with the resident. I agree with the assessment, plan and orders as documented by the resident. Principal Problem:    Atrial flutter ([address]  Active Problems:    Exercise-induced asthma  Resolved Problems:    * No resolved hospital problems. *    Atrial flutter with RVR, newly diagnosed. Spontaneously rate controlled during rounds  [id number] 0  Cardiology consulted, likely for DIANNA cardioversion.     Electronically signed by Dario Grimes MD

## 2021-07-13 NOTE — ED PROVIDER NOTES
[facility]     Emergency Department     Faculty Note/ Attestation      Pt Name: Ranulfo Ford                                       MRN: [id number]  [address] 1965  Date of evaluation: 7/13/2021    Patients PCP:    Vincenzo Ocampo MD      Attestation  I performed a history and physical examination of the patient and discussed management with the resident. I reviewed the residents note and agree with the documented findings and plan of care. Any areas of disagreement are noted on the chart. I was personally present for the key portions of any procedures. I have documented in the chart those procedures where I was not present during the key portions. I have reviewed the emergency nurses triage note. I agree with the chief complaint, past medical history, past surgical history, allergies, medications, social and family history as documented unless otherwise noted below. For Physician Assistant/ Nurse Practitioner cases/documentation I have personally evaluated this patient and have completed at least one if not all key elements of the E/M (history, physical exam, and MDM). Additional findings are as noted. Initial Screens:             Vitals:    Vitals:    07/13/21 1318   BP: (!) 127/91   Pulse: 107   Resp: 19   Temp: 98.4 °F (36.9 °C)   TempSrc: Oral   SpO2: 99%       CHIEF COMPLAINT       Chief Complaint   Patient presents with    Fatigue             DIAGNOSTIC RESULTS             RADIOLOGY:   XR CHEST (2 VW)   Final Result   Degenerative bony changes. No evidence of acute cardiopulmonary process.                LABS:  Labs Reviewed   CBC WITH AUTO DIFFERENTIAL   IMMATURE PLATELET FRACTION   SPECIMEN REJECTION         EMERGENCY DEPARTMENT COURSE:     -------------------------  BP: (!) 127/91, Temp: 98.4 °F (36.9 °C), Pulse: 107, Resp: 19      Comments    Fatigue, SPENCER over last several months  Found to have new Afib with controlled rate at cards    plan for cardiac w/u, dimer, heparin, admit    (Please note that portions of this note were completed with a voice recognition program.  Efforts were made to edit the dictations but occasionally words are mis-transcribed.)      Magdy Sutherland MD,, MD  Attending Emergency Physician         Magdy Sutherland MD  07/13/21 7233

## 2021-07-13 NOTE — DISCHARGE INSTR - COC
Continuity of Care Form    Patient Name: Jo Sun   :  1965  MRN:  [id number]    Admit date:  2021  Discharge date:  ***    Code Status Order: No Order   Advance Directives:     Admitting Physician:  No admitting provider for patient encounter. PCP: Tyrus Phalen, MD    Discharging Nurse: [facility]/Room#: 32  Discharging Unit Phone Number: ***    Emergency Contact:   Extended Emergency Contact Information  Primary Emergency Contact: Jessica Vergara  Address: [address] [place] [address]  Home Phone: [phone]  Relation: Spouse    Past Surgical History:  Past Surgical History:   Procedure Laterality Date    COLONOSCOPY      HYDROCELE EXCISION      KNEE SURGERY      NECK SURGERY      WISDOM TOOTH EXTRACTION         Immunization History: There is no immunization history on file for this patient. Active Problems:  Patient Active Problem List   Diagnosis Code    Otitis media H66.90    Tendonitis of shoulder M77.8    Fatigue R53.83    Herniated disc, cervical M50.20    Bilateral carotid artery stenosis I65.23       Isolation/Infection:   Isolation          No Isolation        Patient Infection Status     None to display          Nurse Assessment:  Last Vital Signs: There were no vitals taken for this visit.     Last documented pain score (0-10 scale):    Last Weight:   Wt Readings from Last 1 Encounters:   21 209 lb (94.8 kg)     Mental Status:  {IP PT MENTAL STATUS:96816}    IV Access:  { NIKITA IV ACCESS:448570756}    Nursing Mobility/ADLs:  Walking   {CHP DME MZQH:563589237}  Transfer  {CHP DME NJGA:281693410}  Bathing  {CHP DME KOYC:154395349}  Dressing  {CHP DME AVNJ:406704559}  Toileting  {CHP DME BSNA:523606147}  Feeding  {CHP DME BTKB:248946099}  Med Admin  {CHP DME ODSI:770077651}  Med Delivery   { NIKITA MED Delivery:448653143}    Wound Care Documentation and Therapy:        Elimination:  Continence:   · Bowel: {YES / CI:93949}  · Bladder: {YES / EX:02136}  Urinary Catheter: {Urinary Catheter:649502933}   Colostomy/Ileostomy/Ileal Conduit: {YES / WO:06113}       Date of Last BM: ***  No intake or output data in the 24 hours ending 21 1253  No intake/output data recorded.     Safety Concerns:     [address] Safety Concerns:461488218}    Impairments/Disabilities:      [address] NIKITA Impairments/Disabilities:159375172}    Nutrition Therapy:  Current Nutrition Therapy:   [address] [facility] Diet List:218501001}    Routes of Feeding: {CHP DME Other Feedings:851833365}  Liquids: {Slp liquid thickness:36477}  Daily Fluid Restriction: {CHP DME Yes amt example:468591221}  Last Modified Barium Swallow with Video (Video Swallowing Test): {Done Not Done DKEH:925851783}    Treatments at the Time of Hospital Discharge:   Respiratory Treatments: ***  Oxygen Therapy:  {Therapy; copd oxygen:61548}  Ventilator:    { CC Vent RTAC:978865227}    Rehab Therapies: {THERAPEUTIC INTERVENTION:6812908939}  Weight Bearing Status/Restrictions: [address]  Weight Bearin}  Other Medical Equipment (for information only, NOT a DME order):  {EQUIPMENT:330355857}  Other Treatments: ***    Patient's personal belongings (please select all that are sent with patient):  {P DME Belongings:122846583}    RN SIGNATURE:  {Esignature:141459237}    CASE MANAGEMENT/SOCIAL WORK SECTION    Inpatient Status Date: ***    Readmission Risk Assessment Score:  Readmission Risk              Risk of Unplanned Readmission:  0           Discharging to Facility/ Agency   · Name:   · Address:  · Phone:  · Fax:    Dialysis Facility (if applicable)   · Name:  · Address:  · Dialysis Schedule:  · Phone:  · Fax:    / signature: {Esignature:421423259}    PHYSICIAN SECTION    Prognosis: {Prognosis:8820435023}    Condition at Discharge: [address] Patient Condition:676817781}    Rehab Potential (if transferring to Rehab): {Prognosis:4873011829}    Recommended Labs or Other Treatments After Discharge: ***    Physician Certification: I certify the above information and transfer of Unice Galeazzi  is necessary for the continuing treatment of the diagnosis listed and that he requires {Admit to Appropriate Level of Care:33422} for {GREATER/LESS:656141637} 30 days.      Update Admission H&P: {[facility]}    PHYSICIAN SIGNATURE:  {Esignature:666291081}

## 2021-07-14 ENCOUNTER — APPOINTMENT (OUTPATIENT)
Dept: CARDIAC CATH/INVASIVE PROCEDURES | Age: 56
DRG: 310 | End: 2021-07-14
Payer: COMMERCIAL

## 2021-07-14 PROBLEM — J45.990 EXERCISE-INDUCED ASTHMA: Status: ACTIVE | Noted: 2021-07-14

## 2021-07-14 LAB
ANION GAP SERPL CALCULATED.3IONS-SCNC: 11 MMOL/L (ref 9–17)
BUN BLDV-MCNC: 14 MG/DL (ref 6–20)
BUN/CREAT BLD: ABNORMAL (ref 9–20)
CALCIUM SERPL-MCNC: 8.5 MG/DL (ref 8.6–10.4)
CHLORIDE BLD-SCNC: 106 MMOL/L (ref 98–107)
CO2: 22 MMOL/L (ref 20–31)
CREAT SERPL-MCNC: 1.07 MG/DL (ref 0.7–1.2)
EKG ATRIAL RATE: 258 BPM
EKG ATRIAL RATE: 271 BPM
EKG Q-T INTERVAL: 366 MS
EKG Q-T INTERVAL: 402 MS
EKG QRS DURATION: 110 MS
EKG QRS DURATION: 98 MS
EKG QTC CALCULATION (BAZETT): 457 MS
EKG QTC CALCULATION (BAZETT): 481 MS
EKG R AXIS: 26 DEGREES
EKG R AXIS: 48 DEGREES
EKG T AXIS: 46 DEGREES
EKG T AXIS: 63 DEGREES
EKG VENTRICULAR RATE: 86 BPM
EKG VENTRICULAR RATE: 94 BPM
GFR AFRICAN AMERICAN: >60 ML/MIN
GFR NON-AFRICAN AMERICAN: >60 ML/MIN
GFR SERPL CREATININE-BSD FRML MDRD: ABNORMAL ML/MIN/{1.73_M2}
GFR SERPL CREATININE-BSD FRML MDRD: ABNORMAL ML/MIN/{1.73_M2}
GLUCOSE BLD-MCNC: 102 MG/DL (ref 70–99)
PARTIAL THROMBOPLASTIN TIME: 34.9 SEC (ref 20.5–30.5)
PARTIAL THROMBOPLASTIN TIME: 39.5 SEC (ref 20.5–30.5)
PARTIAL THROMBOPLASTIN TIME: 46.6 SEC (ref 20.5–30.5)
POTASSIUM SERPL-SCNC: 4 MMOL/L (ref 3.7–5.3)
SARS-COV-2, RAPID: NOT DETECTED
SODIUM BLD-SCNC: 139 MMOL/L (ref 135–144)
SPECIMEN DESCRIPTION: NORMAL

## 2021-07-14 PROCEDURE — 93010 ELECTROCARDIOGRAM REPORT: CPT | Performed by: INTERNAL MEDICINE

## 2021-07-14 PROCEDURE — 36415 COLL VENOUS BLD VENIPUNCTURE: CPT

## 2021-07-14 PROCEDURE — 87635 SARS-COV-2 COVID-19 AMP PRB: CPT

## 2021-07-14 PROCEDURE — 92960 CARDIOVERSION ELECTRIC EXT: CPT | Performed by: INTERNAL MEDICINE

## 2021-07-14 PROCEDURE — 6360000002 HC RX W HCPCS

## 2021-07-14 PROCEDURE — 93005 ELECTROCARDIOGRAM TRACING: CPT | Performed by: INTERNAL MEDICINE

## 2021-07-14 PROCEDURE — 80048 BASIC METABOLIC PNL TOTAL CA: CPT

## 2021-07-14 PROCEDURE — 6370000000 HC RX 637 (ALT 250 FOR IP): Performed by: STUDENT IN AN ORGANIZED HEALTH CARE EDUCATION/TRAINING PROGRAM

## 2021-07-14 PROCEDURE — 85730 THROMBOPLASTIN TIME PARTIAL: CPT

## 2021-07-14 PROCEDURE — 1200000000 HC SEMI PRIVATE

## 2021-07-14 PROCEDURE — 6360000002 HC RX W HCPCS: Performed by: STUDENT IN AN ORGANIZED HEALTH CARE EDUCATION/TRAINING PROGRAM

## 2021-07-14 PROCEDURE — 2580000003 HC RX 258: Performed by: STUDENT IN AN ORGANIZED HEALTH CARE EDUCATION/TRAINING PROGRAM

## 2021-07-14 PROCEDURE — 2500000003 HC RX 250 WO HCPCS: Performed by: STUDENT IN AN ORGANIZED HEALTH CARE EDUCATION/TRAINING PROGRAM

## 2021-07-14 RX ADMIN — METOPROLOL TARTRATE 5 MG: 1 INJECTION, SOLUTION INTRAVENOUS at 19:25

## 2021-07-14 RX ADMIN — HEPARIN SODIUM 2000 UNITS: 1000 INJECTION INTRAVENOUS; SUBCUTANEOUS at 03:03

## 2021-07-14 RX ADMIN — HEPARIN SODIUM AND DEXTROSE 12.4 UNITS/KG/HR: 10000; 5 INJECTION INTRAVENOUS at 03:05

## 2021-07-14 RX ADMIN — HEPARIN SODIUM 2000 UNITS: 1000 INJECTION INTRAVENOUS; SUBCUTANEOUS at 11:50

## 2021-07-14 RX ADMIN — HEPARIN SODIUM AND DEXTROSE 14.4 UNITS/KG/HR: 10000; 5 INJECTION INTRAVENOUS at 16:06

## 2021-07-14 RX ADMIN — HEPARIN SODIUM AND DEXTROSE 14.4 UNITS/KG/HR: 10000; 5 INJECTION INTRAVENOUS at 11:50

## 2021-07-14 RX ADMIN — SODIUM CHLORIDE, PRESERVATIVE FREE 10 ML: 5 INJECTION INTRAVENOUS at 19:26

## 2021-07-14 RX ADMIN — ACETAMINOPHEN 650 MG: 325 TABLET ORAL at 19:25

## 2021-07-14 RX ADMIN — HEPARIN SODIUM AND DEXTROSE 16.4 UNITS/KG/HR: 10000; 5 INJECTION INTRAVENOUS at 21:08

## 2021-07-14 RX ADMIN — HEPARIN SODIUM 2000 UNITS: 1000 INJECTION INTRAVENOUS; SUBCUTANEOUS at 20:57

## 2021-07-14 ASSESSMENT — PAIN SCALES - GENERAL
PAINLEVEL_OUTOF10: 0
PAINLEVEL_OUTOF10: 0
PAINLEVEL_OUTOF10: 3

## 2021-07-14 NOTE — PROGRESS NOTES
[facility]  Internal Medicine Teaching Residency Program  Inpatient Daily Progress Note  ______________________________________________________________________________    Patient: Adolfo Curtis  YOB: 1965   RZK:[id number]    Acct: [de-identified]     Room:   Admit date: 2021  Today's date: 21  Number of days in the hospital: 1    SUBJECTIVE   Admitting Diagnosis: Atrial flutter ([address]  CC: shortness of breath    Pt examined at bedside. Chart & results reviewed. Patient pulse irregular, other wise hemodynamically stable, afebrile. Cardio consult placed, planning cardioversion. ROS:  Constitutional:  negative for chills, fevers, sweats  Respiratory:  negative for cough, hemoptysis, shortness of breath, wheezing  Cardiovascular:  negative for chest pain, chest pressure/discomfort, lower extremity edema, palpitations  Gastrointestinal:  negative for abdominal pain, constipation, diarrhea, nausea, vomiting  Neurological:  negative for dizziness, headache  BRIEF HISTORY      The patient is a pleasant 60 y.o. male presents with PMH of exercise induced asthma a chief complaint of shortness of breath with exertion. He went to PCP who referred him to cardiologist. At the cardiology clinic, He was found to have HR of 130, and was referred to ED. In the ED, he was found to have  --> 90's, controlled on it's own. Patient stated his shortness of breath started after Moderna vaccine on may 1st. His symptoms are also associated with early fatigue  Labs showed pro-bnp 401. CXR was normal. Patient HR control on it's own for now, on lopressor 5 mg if HR > 100. Chadvasc score of 0. Patient on heparin.      OBJECTIVE     Vital Signs:  BP (!) 124/95   Pulse 100   Temp 97.8 °F (36.6 °C) (Oral)   Resp 16   Ht 6' (1.829 m)   Wt 209 lb 14.4 oz (95.2 kg)   SpO2 98%   BMI 28.47 kg/m²     Temp (24hrs), Av °F (36.7 °C), Min:97.7 °F (36.5 °C), Max:98.4 °F (36.9 °C)    In: -   Out: 350 [Urine:350]    Physical Exam:  Constitutional: This is a well developed, well nourished, 25-29.9 - Overweight 60y.o. year old male who is alert, oriented, cooperative and in no apparent distress. Head:normocephalic and atraumatic. EENT:  PERRLA. No conjunctival injections. Septum was midline, mucosa was without erythema, exudates or cobblestoning. No thrush was noted. Neck: Supple without thyromegaly. No elevated JVP. Trachea was midline. Respiratory: Chest was symmetrical without dullness to percussion. Breath sounds bilaterally were clear to auscultation. There were no wheezes, rhonchi or rales. There is no intercostal retraction or use of accessory muscles. No egophony noted. Cardiovascular: Regular without murmur, clicks, gallops or rubs. Abdomen: Slightly rounded and soft without organomegaly. No rebound, rigidity or guarding was appreciated. Lymphatic: No lymphadenopathy. Musculoskeletal: Normal curvature of the spine. No gross muscle weakness. Extremities:  No lower extremity edema, ulcerations, tenderness, varicosities or erythema. Muscle size, tone and strength are normal.  No involuntary movements are noted. Skin:  Warm and dry. Good color, turgor and pigmentation. No lesions or scars.   No cyanosis or clubbing  Neurological/Psychiatric: The patient's general behavior, level of consciousness, thought content and emotional status is normal.        Medications:  Scheduled Medications:    budesonide-formoterol  2 puff Inhalation BID    sodium chloride flush  5-40 mL Intravenous 2 times per day     Continuous Infusions:    sodium chloride      heparin (PORCINE) Infusion 12.4 Units/kg/hr (07/14/21 0305)     PRN Medicationssodium chloride flush, 5-40 mL, PRN  sodium chloride, 25 mL, PRN  ondansetron, 4 mg, Q8H PRN   Or  ondansetron, 4 mg, Q6H PRN  polyethylene glycol, 17 g, Daily PRN  acetaminophen, 650 mg, Q6H PRN   Or  acetaminophen, 650 mg, Q6H PRN  potassium chloride, 40 mEq, PRN   Or  potassium alternative oral replacement, 40 mEq, PRN   Or  potassium chloride, 10 mEq, PRN  heparin (porcine), 4,000 Units, PRN  heparin (porcine), 2,000 Units, PRN  metoprolol, 5 mg, Q6H PRN        Diagnostic Labs:  CBC:   Recent Labs     07/13/21  1308   WBC 6.0   RBC 4.72   HGB 15.1   HCT 45.9   MCV 97.2   RDW 12.7   PLT See Reflexed IPF Result     BMP:   Recent Labs     07/13/21  1430 07/14/21  0237    139   K 4.3 4.0    106   CO2 23 22   PHOS 3.5  --    BUN 16 14   CREATININE 0.99 1.07     BNP: No results for input(s): BNP in the last 72 hours. PT/INR: No results for input(s): PROTIME, INR in the last 72 hours. APTT:   Recent Labs     07/13/21  1838 07/14/21  0237 07/14/21  1026   APTT 24.8 34.9* 39.5*     CARDIAC ENZYMES: No results for input(s): CKMB, CKMBINDEX, TROPONINI in the last 72 hours. Invalid input(s): CKTOTAL;3  FASTING LIPID PANEL:  Lab Results   Component Value Date    CHOL 187 07/02/2020    HDL 53 07/02/2020    TRIG 87 07/02/2020     LIVER PROFILE:   Recent Labs     07/13/21  1430   AST 20   ALT 28   BILITOT 0.34   ALKPHOS 52      MICROBIOLOGY: No results found for: CULTURE    Imaging:    XR CHEST (2 VW)    Result Date: 7/13/2021  Degenerative bony changes. No evidence of acute cardiopulmonary process. ASSESSMENT & PLAN     ASSESSMENT / PLAN:     Active Problems:    Atrial flutter (HCC)  Exercise induced asthma    Atrial flutter (HCC)  Plan: cardio consult placed  - HR control on it's own for now, on lopressor 5 mg if HR > 100.   - CHADVASC score 0, labs within normal limit  - CXR negative  - pr bnp elevated 401  - cardio consult placed, planning cardioversion    Exercise Induced asthma  - On symbicort     DVT ppx : heparin  GI ppx:     PT/OT: pt/ot consult  Discharge Planning / SW:  on board    Ajit Coleman MD  Internal Medicine Resident, PGY-1  [address], [place]  7/14/2021, 11:51 AM

## 2021-07-14 NOTE — PROGRESS NOTES
Patient admitted, consent signed, all questions answered. Pt ready for procedure. Bed in low position, call light to reach with side rails up 2 of 2.  family at bedside with patient.

## 2021-07-14 NOTE — PROGRESS NOTES
Occupational [address]  Occupational Therapy Not Seen Note    DATE: 2021    NAME: Juan Antonio Mcguire  MRN: [id number]   : 1965      Patient not seen this date for Occupational Therapy due to: Other: Per chart, possible cardioversion d/t atrial flutter. Await post cardioversion and further POC. RN agreed to await EOB/OOB activity.      Next Scheduled Treatment: Check in PM or 7/15     Electronically signed by Chelly Solis OT on 2021 at 10:15 AM

## 2021-07-14 NOTE — PLAN OF CARE
Patient was brought to the cath lab for DIANNA cardioversion. Patient was unable to tolerate  DIANNA probe due to strong gag reflex despite multiple attempts. Will cancel the procedure and will perform DIANNA cardioversion tomorrow under anesthesia.

## 2021-07-14 NOTE — CARE COORDINATION
Attempt made to meet with patient for initial interview.   Patient currently off the floor, will try again later as able
needs          Electronically signed by Max Bronson RN on 7/14/21 at 5:31 PM EDT

## 2021-07-15 ENCOUNTER — ANESTHESIA (OUTPATIENT)
Dept: CARDIAC CATH/INVASIVE PROCEDURES | Age: 56
DRG: 310 | End: 2021-07-15
Payer: COMMERCIAL

## 2021-07-15 ENCOUNTER — ANESTHESIA EVENT (OUTPATIENT)
Dept: CARDIAC CATH/INVASIVE PROCEDURES | Age: 56
DRG: 310 | End: 2021-07-15
Payer: COMMERCIAL

## 2021-07-15 ENCOUNTER — APPOINTMENT (OUTPATIENT)
Dept: CARDIAC CATH/INVASIVE PROCEDURES | Age: 56
DRG: 310 | End: 2021-07-15
Payer: COMMERCIAL

## 2021-07-15 VITALS — OXYGEN SATURATION: 96 % | DIASTOLIC BLOOD PRESSURE: 57 MMHG | SYSTOLIC BLOOD PRESSURE: 80 MMHG

## 2021-07-15 LAB
ANION GAP SERPL CALCULATED.3IONS-SCNC: 12 MMOL/L (ref 9–17)
BUN BLDV-MCNC: 16 MG/DL (ref 6–20)
BUN/CREAT BLD: ABNORMAL (ref 9–20)
CALCIUM SERPL-MCNC: 8.7 MG/DL (ref 8.6–10.4)
CHLORIDE BLD-SCNC: 105 MMOL/L (ref 98–107)
CO2: 21 MMOL/L (ref 20–31)
CREAT SERPL-MCNC: 1.06 MG/DL (ref 0.7–1.2)
GFR AFRICAN AMERICAN: >60 ML/MIN
GFR NON-AFRICAN AMERICAN: >60 ML/MIN
GFR SERPL CREATININE-BSD FRML MDRD: ABNORMAL ML/MIN/{1.73_M2}
GFR SERPL CREATININE-BSD FRML MDRD: ABNORMAL ML/MIN/{1.73_M2}
GLUCOSE BLD-MCNC: 108 MG/DL (ref 70–99)
LV EF: 55 %
LVEF MODALITY: NORMAL
PARTIAL THROMBOPLASTIN TIME: 61.1 SEC (ref 20.5–30.5)
POTASSIUM SERPL-SCNC: 4.1 MMOL/L (ref 3.7–5.3)
SODIUM BLD-SCNC: 138 MMOL/L (ref 135–144)

## 2021-07-15 PROCEDURE — 7100000010 HC PHASE II RECOVERY - FIRST 15 MIN

## 2021-07-15 PROCEDURE — 3700000000 HC ANESTHESIA ATTENDED CARE

## 2021-07-15 PROCEDURE — 1200000000 HC SEMI PRIVATE

## 2021-07-15 PROCEDURE — 92960 CARDIOVERSION ELECTRIC EXT: CPT | Performed by: INTERNAL MEDICINE

## 2021-07-15 PROCEDURE — 36415 COLL VENOUS BLD VENIPUNCTURE: CPT

## 2021-07-15 PROCEDURE — 85730 THROMBOPLASTIN TIME PARTIAL: CPT

## 2021-07-15 PROCEDURE — 93325 DOPPLER ECHO COLOR FLOW MAPG: CPT

## 2021-07-15 PROCEDURE — 99232 SBSQ HOSP IP/OBS MODERATE 35: CPT | Performed by: INTERNAL MEDICINE

## 2021-07-15 PROCEDURE — 6360000002 HC RX W HCPCS: Performed by: STUDENT IN AN ORGANIZED HEALTH CARE EDUCATION/TRAINING PROGRAM

## 2021-07-15 PROCEDURE — 80048 BASIC METABOLIC PNL TOTAL CA: CPT

## 2021-07-15 PROCEDURE — 94761 N-INVAS EAR/PLS OXIMETRY MLT: CPT

## 2021-07-15 PROCEDURE — B246ZZ4 ULTRASONOGRAPHY OF RIGHT AND LEFT HEART, TRANSESOPHAGEAL: ICD-10-PCS | Performed by: INTERNAL MEDICINE

## 2021-07-15 PROCEDURE — 6360000002 HC RX W HCPCS

## 2021-07-15 PROCEDURE — 5A2204Z RESTORATION OF CARDIAC RHYTHM, SINGLE: ICD-10-PCS | Performed by: INTERNAL MEDICINE

## 2021-07-15 PROCEDURE — 93312 ECHO TRANSESOPHAGEAL: CPT

## 2021-07-15 PROCEDURE — 7100000011 HC PHASE II RECOVERY - ADDTL 15 MIN

## 2021-07-15 PROCEDURE — 6360000002 HC RX W HCPCS: Performed by: NURSE ANESTHETIST, CERTIFIED REGISTERED

## 2021-07-15 PROCEDURE — 3700000001 HC ADD 15 MINUTES (ANESTHESIA)

## 2021-07-15 PROCEDURE — 2580000003 HC RX 258: Performed by: STUDENT IN AN ORGANIZED HEALTH CARE EDUCATION/TRAINING PROGRAM

## 2021-07-15 PROCEDURE — 2500000003 HC RX 250 WO HCPCS: Performed by: NURSE ANESTHETIST, CERTIFIED REGISTERED

## 2021-07-15 RX ORDER — SODIUM CHLORIDE 9 MG/ML
25 INJECTION, SOLUTION INTRAVENOUS PRN
Status: CANCELLED | OUTPATIENT
Start: 2021-07-15

## 2021-07-15 RX ORDER — PROPOFOL 10 MG/ML
INJECTION, EMULSION INTRAVENOUS CONTINUOUS PRN
Status: DISCONTINUED | OUTPATIENT
Start: 2021-07-15 | End: 2021-07-15 | Stop reason: SDUPTHER

## 2021-07-15 RX ORDER — MIDAZOLAM HYDROCHLORIDE 1 MG/ML
INJECTION INTRAMUSCULAR; INTRAVENOUS PRN
Status: DISCONTINUED | OUTPATIENT
Start: 2021-07-15 | End: 2021-07-15 | Stop reason: SDUPTHER

## 2021-07-15 RX ORDER — SODIUM CHLORIDE 0.9 % (FLUSH) 0.9 %
5-40 SYRINGE (ML) INJECTION PRN
Status: CANCELLED | OUTPATIENT
Start: 2021-07-15

## 2021-07-15 RX ORDER — MEPERIDINE HYDROCHLORIDE 50 MG/ML
12.5 INJECTION INTRAMUSCULAR; INTRAVENOUS; SUBCUTANEOUS EVERY 5 MIN PRN
Status: DISCONTINUED | OUTPATIENT
Start: 2021-07-15 | End: 2021-07-16 | Stop reason: HOSPADM

## 2021-07-15 RX ORDER — ONDANSETRON 2 MG/ML
4 INJECTION INTRAMUSCULAR; INTRAVENOUS
Status: ACTIVE | OUTPATIENT
Start: 2021-07-15 | End: 2021-07-15

## 2021-07-15 RX ORDER — LIDOCAINE HYDROCHLORIDE 10 MG/ML
INJECTION, SOLUTION EPIDURAL; INFILTRATION; INTRACAUDAL; PERINEURAL PRN
Status: DISCONTINUED | OUTPATIENT
Start: 2021-07-15 | End: 2021-07-15 | Stop reason: SDUPTHER

## 2021-07-15 RX ORDER — SODIUM CHLORIDE 0.9 % (FLUSH) 0.9 %
5-40 SYRINGE (ML) INJECTION EVERY 12 HOURS SCHEDULED
Status: CANCELLED | OUTPATIENT
Start: 2021-07-15

## 2021-07-15 RX ORDER — FENTANYL CITRATE 50 UG/ML
25 INJECTION, SOLUTION INTRAMUSCULAR; INTRAVENOUS EVERY 5 MIN PRN
Status: DISCONTINUED | OUTPATIENT
Start: 2021-07-15 | End: 2021-07-16 | Stop reason: HOSPADM

## 2021-07-15 RX ORDER — PROPOFOL 10 MG/ML
INJECTION, EMULSION INTRAVENOUS
Status: COMPLETED
Start: 2021-07-15 | End: 2021-07-15

## 2021-07-15 RX ADMIN — HEPARIN SODIUM AND DEXTROSE 16.4 UNITS/KG/HR: 10000; 5 INJECTION INTRAVENOUS at 05:21

## 2021-07-15 RX ADMIN — LIDOCAINE HYDROCHLORIDE 50 MG: 10 INJECTION, SOLUTION EPIDURAL; INFILTRATION; INTRACAUDAL; PERINEURAL at 11:37

## 2021-07-15 RX ADMIN — MIDAZOLAM HYDROCHLORIDE 1 MG: 1 INJECTION, SOLUTION INTRAMUSCULAR; INTRAVENOUS at 11:39

## 2021-07-15 RX ADMIN — PROPOFOL INJECTABLE EMULSION 100 MCG/KG/MIN: 10 INJECTION, EMULSION INTRAVENOUS at 11:39

## 2021-07-15 RX ADMIN — MIDAZOLAM HYDROCHLORIDE 1 MG: 1 INJECTION, SOLUTION INTRAMUSCULAR; INTRAVENOUS at 11:37

## 2021-07-15 RX ADMIN — SODIUM CHLORIDE, PRESERVATIVE FREE 10 ML: 5 INJECTION INTRAVENOUS at 20:51

## 2021-07-15 ASSESSMENT — PULMONARY FUNCTION TESTS
PIF_VALUE: 0

## 2021-07-15 ASSESSMENT — PAIN SCALES - GENERAL
PAINLEVEL_OUTOF10: 0
PAINLEVEL_OUTOF10: 0

## 2021-07-15 NOTE — FLOWSHEET NOTE
Assessment: Patient was awake and alert when  visited. Family was present and open to spiritual care. Patient remained hopeful and seemed to have confidence in the medical staff. When asked how he was feeling, patient responded; \"I am feeling okay. \" Patient said he was raised Hinduism but not affiliated with any [place]. Patient received sacrament of anointing of the sick. Intervention:  provided presence, offered support and prayed with patient and family. Outcome: Patient and family expressed appreciation for the anointing and spiritual support their received. Follow up visits recommended for more prayers and support. 07/15/21 1044   Encounter Summary   Services provided to: Patient and family together   Support System Family members   [facility]   ([facility]   Complexity of Encounter Moderate   Length of Encounter 30 minutes   Spiritual Assessment Completed Yes   Routine   Type Initial   Spiritual/Baptism   Type Spiritual support   Assessment Calm; Approachable; Hopeful   Intervention Active listening;Nurtured hope;Prayer; Anointing   Outcome Expressed gratitude   Sacraments   Sacrament of Sick-Anointing Anointed

## 2021-07-15 NOTE — PROGRESS NOTES
Physical Therapy        Physical Therapy Cancel Note      DATE: 7/15/2021    NAME: Juan Antonio Mcguire  MRN: [id number]   : 1965      Patient not seen this date for Physical Therapy due to:     Other: await cardioversion      Electronically signed by Sandra Drake PT on 7/15/2021 at 1:21 PM

## 2021-07-15 NOTE — PROCEDURES
[facility]  DIANNA / Cardioversion procedure Note         Today's Date: 7/15/2021    Operators:  Primary: Dash Lassiter MD (Attending)   Assistant: Tung Kim MD (Fellow)    Patient seen and examined. History and Physical reviewed. Labs reviewed. After informed consent was obtained with explanation of the risks and benefits, the patient was brought to Cath lab. All sedation was administered by the anesthesiology. The oropharynx was pre anesthetisized with cetacaine spray. DIANNA:    Structures:  LA: Normal  NATALIE: No thrombus  RA: Normal  RV:  Normal  LV: Normal in size with normal systolic function   Estimated LVEF: 55%  Aorta: Mild atheromatous disease arch  Percardium: No pericardial effusion  Septum: No intracardiac shunt via color Doppler. No intracardiac shunt via injection of agitated saline. Valves:  Mitral Valve: Structurally normal. Trivial regurgitation is identified. Aortic Valve: The aortic valve is trileaflet and opens adequately. No regurgitiation is identified. Tricuspid valve: Structurally normal. No regurgitation is identified. Pulmonary valve: Normal. No significant regurgitation    No valvular vegetations or thrombus identified. Summary:     1. A DIANNA was performed without complications. 2. Normal LV size with normal systolic function. Estimated LVEF 55%  3. No thrombus or valvular vegetation identified      CARDIOVERSION:  After an adequate level of sedation was achieved, 150J in biphasic synchronized delivery was administered. conversion to normal sinus rhythm. The patient awoke without complications. The patient will continue with the discharge meds and has been instructed to follow-up with his cardiologist for continued long term care and cardiovascular management. There were no complications encountered.     Electronically signed by Tung Kim MD on 7/15/2021 at 12:14 PM  Fellow, [address] [place] [address], [address] [place] [address]

## 2021-07-15 NOTE — ANESTHESIA POSTPROCEDURE EVALUATION
Department of Anesthesiology  Postprocedure Note    Patient: Darshan Keyes  MRN: [id number]  YOB: 1965  Date of evaluation: 7/15/2021  Time:  3:26 PM     Procedure Summary     Date: 07/15/21 Room / Location: [facility] Cath Lab    Anesthesia Start: 9712 Anesthesia Stop: 1214    Procedure: CATH LAB WITH ANESTHESIA Diagnosis:     Scheduled Providers: Arielle Dolan MD; FELY Moore - CRNA Responsible Provider: Arielle Dolan MD    Anesthesia Type: MAC, general ASA Status: 3          Anesthesia Type: MAC, general    All Phase I:      All Phase II:      Last vitals: Reviewed and per EMR flowsheets.    POST-OP ANESTHESIA NOTE       BP 95/62   Pulse 79   Temp 97.7 °F (36.5 °C) (Oral)   Resp 15   Ht 6' (1.829 m)   Wt 209 lb 14.4 oz (95.2 kg)   SpO2 98%   BMI 28.47 kg/m²    Pain Assessment: FLACC  Pain Level: 0     Anesthesia Post Evaluation    Patient location during evaluation: PACU  Patient participation: complete - patient participated  Level of consciousness: awake  Pain score: 0  Airway patency: patent  Nausea & Vomiting: no vomiting and no nausea  Complications: no  Cardiovascular status: hemodynamically stable  Respiratory status: acceptable  Hydration status: stable

## 2021-07-15 NOTE — PROGRESS NOTES
[facility]  Occupational Therapy Not Seen Note    DATE: 7/15/2021    NAME: Po Jackson  MRN: [id number]   : 1965      Patient not seen this date for Occupational Therapy due to:    Surgery/Procedure: Cardioversion, Patient possibly independent ck post to assess for any needs    Next Scheduled Treatment: Ck      Electronically signed by Digna Bedoya OT on 7/15/2021 at 9:53 AM

## 2021-07-15 NOTE — PLAN OF CARE
Problem: Pain:  Goal: Pain level will decrease  Description: Pain level will decrease  7/15/2021 0654 by Celestino Schrader RN  Outcome: Ongoing  Goal: Control of acute pain  Description: Control of acute pain  7/15/2021 0654 by Celestino Schrader RN  Outcome: Ongoing  Goal: Control of chronic pain  Description: Control of chronic pain  7/15/2021 0654 by Celestino Schrader RN  Outcome: Ongoing

## 2021-07-15 NOTE — PROGRESS NOTES
POST DIANNA/CARDIOVERSION out [facility] room 8 sleepy but arousable. Report at bedside with andesthesia / MAC / PHASE 2 initiated. Oxygen on 3 liters without distress. PARS 8 / Assessment reviewed and remains NPO. Heparin gtt continues at 15.6 cc. Writer at bedside.   Side rails up 3 of 4

## 2021-07-15 NOTE — PROGRESS NOTES
Pt c/o pain, and headache around 1900 last night. Pt's hr was sustaining in the 130's. Writer gave lopressor 5mg IV and acetaminophen. Pt's condition improved and hr was stable in the 90's all night.

## 2021-07-15 NOTE — PROGRESS NOTES
RECOVERY PHASE COMPLETE and patient transported in bed back to room 2001 with report given to Joaquim Dwyer at bedside. Fast patches removed from chest with faint red marks noted / no open areas.

## 2021-07-15 NOTE — PROGRESS NOTES
[facility]  Pre-Procedure Conscious Sedation Data         Pre Procedure Conscious Sedation Data:  ASA Class:                  [] I [x] II [] III [] IV     Mallampati Class:      Procedure to be done under general anesthesia    Joi Reynoso MD  Cardiovascular Disease Fellow  [address] [facility]

## 2021-07-15 NOTE — ANESTHESIA PRE PROCEDURE
Department of Anesthesiology  Preprocedure Note       Name:  Jair Valerio   Age:  60 y.o.  :  1965                                          MRN:  [id number]         Date:  7/15/2021      Surgeon: * Surgery not found *    Procedure:  DIANNA    Medications prior to admission:   Prior to Admission medications    Medication Sig Start Date End Date Taking?  Authorizing Provider   Multiple Vitamins-Minerals (THERAPEUTIC MULTIVITAMIN-MINERALS) tablet Take 1 tablet by mouth daily    Historical Provider, MD       Current medications:    Current Facility-Administered Medications   Medication Dose Route Frequency Provider Last Rate Last Admin    budesonide-formoterol (SYMBICORT) 80-4.5 MCG/ACT inhaler 2 puff  2 puff Inhalation BID Wendy Ocasio MD        sodium chloride flush 0.9 % injection 5-40 mL  5-40 mL Intravenous 2 times per day Wendy Ocasio MD   10 mL at 21    sodium chloride flush 0.9 % injection 5-40 mL  5-40 mL Intravenous PRN Wendy Ocasio MD        0.9 % sodium chloride infusion  25 mL Intravenous PRN Wendy Ocasio MD        ondansetron (ZOFRAN-ODT) disintegrating tablet 4 mg  4 mg Oral Q8H PRN Wendy Ocasio MD        Or    ondansetron (ZOFRAN) injection 4 mg  4 mg Intravenous Q6H PRN Wendy Ocasio MD        polyethylene glycol (GLYCOLAX) packet 17 g  17 g Oral Daily PRN Wendy Ocasio MD        acetaminophen (TYLENOL) tablet 650 mg  650 mg Oral Q6H PRN Wendy Ocasio MD   650 mg at 21    Or    acetaminophen (TYLENOL) suppository 650 mg  650 mg Rectal Q6H PRN Wendy Ocasio MD        potassium chloride (KLOR-CON M) extended release tablet 40 mEq  40 mEq Oral PRN Wendy Ocasio MD        Or    potassium bicarb-citric acid (EFFER-K) effervescent tablet 40 mEq  40 mEq Oral PRN Wendy Ocasio MD        Or    potassium chloride 10 mEq/100 mL IVPB (Peripheral Line)  10 mEq Intravenous PRN Wendy Ocasio MD        heparin Readings from Last 3 Encounters:   07/13/21 209 lb 14.4 oz (95.2 kg)   07/06/21 209 lb (94.8 kg)   06/07/21 213 lb (96.6 kg)     Body mass index is 28.47 kg/m². CBC:   Lab Results   Component Value Date    WBC 6.0 07/13/2021    RBC 4.72 07/13/2021    HGB 15.1 07/13/2021    HCT 45.9 07/13/2021    MCV 97.2 07/13/2021    RDW 12.7 07/13/2021    PLT See Reflexed IPF Result 07/13/2021       CMP:   Lab Results   Component Value Date     07/15/2021    K 4.1 07/15/2021     07/15/2021    CO2 21 07/15/2021    BUN 16 07/15/2021    CREATININE 1.06 07/15/2021    GFRAA >60 07/15/2021    LABGLOM >60 07/15/2021    GLUCOSE 108 07/15/2021    PROT 6.8 07/13/2021    CALCIUM 8.7 07/15/2021    BILITOT 0.34 07/13/2021    ALKPHOS 52 07/13/2021    AST 20 07/13/2021    ALT 28 07/13/2021       POC Tests: No results for input(s): POCGLU, POCNA, POCK, POCCL, POCBUN, POCHEMO, POCHCT in the last 72 hours.     Coags:   Lab Results   Component Value Date    APTT 61.1 07/15/2021       HCG (If Applicable): No results found for: PREGTESTUR, PREGSERUM, HCG, HCGQUANT     ABGs: No results found for: PHART, PO2ART, JRK8LSW, FMV8XHE, BEART, R5WPCNZU     Type & Screen (If Applicable):  No results found for: LABABO, LABRH    Drug/Infectious Status (If Applicable):  No results found for: HIV, HEPCAB    COVID-19 Screening (If Applicable):   Lab Results   Component Value Date    COVID19 Not Detected 07/14/2021         EKG 7/14/2021  Atrial flutter with variable A-V block  Nonspecific ST abnormality  Prolonged QT  Abnormal ECG  When compared with ECG of 13-JUL-2021 13:22,  No significant change was found      EKG 7/13/2021  Atrial flutter with variable A-V block  Abnormal ECG  No previous ECGs available      Anesthesia Evaluation    Airway: Mallampati: III  TM distance: >3 FB   Neck ROM: full  Mouth opening: > = 3 FB Dental:    (+) caps and other      Pulmonary:normal exam    (+) asthma:                            Cardiovascular:    (+) dysrhythmias: atrial flutter,                   Neuro/Psych:   Negative Neuro/Psych ROS              GI/Hepatic/Renal: Neg GI/Hepatic/Renal ROS            Endo/Other: Negative Endo/Other ROS                    Abdominal:             Vascular: negative vascular ROS. Other Findings:           Anesthesia Plan      MAC and general     ASA 3       Induction: intravenous. MIPS: Postoperative opioids intended. Anesthetic plan and risks discussed with patient. Plan discussed with CRNA.                   Cortney Lerma MD   7/15/2021

## 2021-07-15 NOTE — PROGRESS NOTES
[facility]  Internal Medicine Teaching Residency Program  Inpatient Daily Progress Note  ______________________________________________________________________________    Patient: Abel Oquendo  YOB: 1965   [facility][id number]    Acct: [de-identified]     Room: 2001/2001-01  Admit date: 7/13/2021  Today's date: 07/15/21  Number of days in the hospital: 2    SUBJECTIVE   Admitting Diagnosis: Atrial flutter ([address]  CC: shortness of breath    Pt examined at bedside. Chart & results reviewed. Patient scheduled for DIANNA cardioversion under anesthesia today. Couldn't undergo yesterday due to strong gag reflex. Patient mentioned he has fusion of C3,C4,C5 with plate placed. Patient hemodynamically stable except pulse irregular. Afebrile. ROS:  Constitutional:  negative for chills, fevers, sweats  Respiratory:  negative for cough, dyspnea on exertion, hemoptysis, shortness of breath, wheezing  Cardiovascular:  negative for chest pain, chest pressure/discomfort, lower extremity edema, palpitations  Gastrointestinal:  negative for abdominal pain, constipation, diarrhea, nausea, vomiting  Neurological:  negative for dizziness, headache  BRIEF HISTORY      The patient is a pleasant 55 y. o. male presents with PMH of exercise induced asthma a chief complaint of shortness of breath with exertion. He went to PCP who referred him to cardiologist. At the cardiology clinic, He was found to have HR of 130, and was referred to ED. In the ED, he was found to have  --> 90's, controlled on it's own. Patient stated his shortness of breath started after Moderna vaccine on may 1st. His symptoms are also associated with early fatigue  Labs showed pro-bnp 401. CXR was normal. Patient HR control on it's own for now, on lopressor 5 mg if HR > 100. Chadvasc score of 0.  Patient on heparin.     OBJECTIVE     Vital Signs:  BP 95/62   Pulse 79   Temp 97.7 °F (36.5 °C) (Oral)   Resp 15   Ht 6' (1.829 m)   Wt 209 lb 14.4 oz (95.2 kg)   SpO2 98%   BMI 28.47 kg/m²     Temp (24hrs), Av °F (36.7 °C), Min:97.7 °F (36.5 °C), Max:98.6 °F (37 °C)    In: 450   Out: -     Physical Exam:  Constitutional: This is a well developed, well nourished, 25-29.9 - Overweight 60y.o. year old male who is alert, oriented, cooperative and in no apparent distress. Head:normocephalic and atraumatic. EENT:  PERRLA. No conjunctival injections. Septum was midline, mucosa was without erythema, exudates or cobblestoning. No thrush was noted. Neck: Supple without thyromegaly. No elevated JVP. Trachea was midline. Respiratory: Chest was symmetrical without dullness to percussion. Breath sounds bilaterally were clear to auscultation. There were no wheezes, rhonchi or rales. There is no intercostal retraction or use of accessory muscles. No egophony noted. Cardiovascular: Regular without murmur, clicks, gallops or rubs. Abdomen: Slightly rounded and soft without organomegaly. No rebound, rigidity or guarding was appreciated. Lymphatic: No lymphadenopathy. Musculoskeletal: Normal curvature of the spine. No gross muscle weakness. Extremities:  No lower extremity edema, ulcerations, tenderness, varicosities or erythema. Muscle size, tone and strength are normal.  No involuntary movements are noted. Skin:  Warm and dry. Good color, turgor and pigmentation. No lesions or scars.   No cyanosis or clubbing  Neurological/Psychiatric: The patient's general behavior, level of consciousness, thought content and emotional status is normal.        Medications:  Scheduled Medications:    budesonide-formoterol  2 puff Inhalation BID    sodium chloride flush  5-40 mL Intravenous 2 times per day     Continuous Infusions:    sodium chloride      heparin (PORCINE) Infusion 16.4 Units/kg/hr (07/15/21 0521)     PRN Medicationsmeperidine, 12.5 mg, Q5 Min PRN  ondansetron, 4 mg, Once PRN  fentanNYL, 25 mcg, Q5 Min PRN  sodium chloride flush, 5-40 mL, PRN  sodium chloride, 25 mL, PRN  ondansetron, 4 mg, Q8H PRN   Or  ondansetron, 4 mg, Q6H PRN  polyethylene glycol, 17 g, Daily PRN  acetaminophen, 650 mg, Q6H PRN   Or  acetaminophen, 650 mg, Q6H PRN  potassium chloride, 40 mEq, PRN   Or  potassium alternative oral replacement, 40 mEq, PRN   Or  potassium chloride, 10 mEq, PRN  heparin (porcine), 4,000 Units, PRN  heparin (porcine), 2,000 Units, PRN  metoprolol, 5 mg, Q6H PRN        Diagnostic Labs:  CBC:   Recent Labs     07/13/21  1308   WBC 6.0   RBC 4.72   HGB 15.1   HCT 45.9   MCV 97.2   RDW 12.7   PLT See Reflexed IPF Result     BMP:   Recent Labs     07/13/21  1430 07/14/21  0237 07/15/21  0235    139 138   K 4.3 4.0 4.1    106 105   CO2 23 22 21   PHOS 3.5  --   --    BUN 16 14 16   CREATININE 0.99 1.07 1.06     BNP: No results for input(s): BNP in the last 72 hours. PT/INR: No results for input(s): PROTIME, INR in the last 72 hours. APTT:   Recent Labs     07/14/21  1026 07/14/21  1721 07/15/21  0235   APTT 39.5* 46.6* 61.1*     CARDIAC ENZYMES: No results for input(s): CKMB, CKMBINDEX, TROPONINI in the last 72 hours. Invalid input(s): CKTOTAL;3  FASTING LIPID PANEL:  Lab Results   Component Value Date    CHOL 187 07/02/2020    HDL 53 07/02/2020    TRIG 87 07/02/2020     LIVER PROFILE:   Recent Labs     07/13/21  1430   AST 20   ALT 28   BILITOT 0.34   ALKPHOS 52      MICROBIOLOGY: No results found for: CULTURE    Imaging:    XR CHEST (2 VW)    Result Date: 7/13/2021  Degenerative bony changes. No evidence of acute cardiopulmonary process.        ASSESSMENT & PLAN     ASSESSMENT / PLAN:     Active Problems:    Atrial flutter (HCC)  Exercise induced asthma     Atrial flutter (HCC)  Plan: cardio consult placed  - HR control on it's own for now, on lopressor 5 mg if HR > 100.   - CHADVASC score 0, labs within normal limit  - CXR negative  - pr bnp elevated 401  - cardio on board, patient couldn't under go DIANNA cardioversion due to strong gag reflex. - DIANNA with cardioversion under anesthesia today     Exercise Induced asthma  - On symbicort     DVT ppx : heparin  GI ppx:     PT/OT: pt/ot on board  Discharge Planning / SW:  on board    Dex Rodríguez MD  Internal Medicine Resident, PGY-1  [facility]; [place], [place]  7/15/2021, 1:43 PM    Attending Physician Statement  I have discussed the care of Jorje Rahman with the resident team. I have examined the patient myself and taken ros and hpi , including pertinent history and exam findings,  with the resident. I have reviewed the key elements of all parts of the encounter with the resident. I agree with the assessment, plan and orders as documented by the resident. Principal Problem:    Atrial flutter ([address]  Active Problems:    Exercise-induced asthma  Resolved Problems:    * No resolved hospital problems. *    DIANNA could not be completed yesterday due to difficulty insertion the scope  Plan for DIANNA cardioversion under general anesthesia today.     Electronically signed by General Irma MD

## 2021-07-16 VITALS
WEIGHT: 209.9 LBS | SYSTOLIC BLOOD PRESSURE: 106 MMHG | RESPIRATION RATE: 17 BRPM | OXYGEN SATURATION: 96 % | HEART RATE: 77 BPM | DIASTOLIC BLOOD PRESSURE: 72 MMHG | HEIGHT: 72 IN | TEMPERATURE: 98.8 F | BODY MASS INDEX: 28.43 KG/M2

## 2021-07-16 LAB
ANION GAP SERPL CALCULATED.3IONS-SCNC: 13 MMOL/L (ref 9–17)
BUN BLDV-MCNC: 13 MG/DL (ref 6–20)
BUN/CREAT BLD: ABNORMAL (ref 9–20)
CALCIUM SERPL-MCNC: 8.3 MG/DL (ref 8.6–10.4)
CHLORIDE BLD-SCNC: 107 MMOL/L (ref 98–107)
CO2: 20 MMOL/L (ref 20–31)
CREAT SERPL-MCNC: 0.87 MG/DL (ref 0.7–1.2)
GFR AFRICAN AMERICAN: >60 ML/MIN
GFR NON-AFRICAN AMERICAN: >60 ML/MIN
GFR SERPL CREATININE-BSD FRML MDRD: ABNORMAL ML/MIN/{1.73_M2}
GFR SERPL CREATININE-BSD FRML MDRD: ABNORMAL ML/MIN/{1.73_M2}
GLUCOSE BLD-MCNC: 98 MG/DL (ref 70–99)
PARTIAL THROMBOPLASTIN TIME: 65.1 SEC (ref 20.5–30.5)
PARTIAL THROMBOPLASTIN TIME: 66.5 SEC (ref 20.5–30.5)
POTASSIUM SERPL-SCNC: 4.1 MMOL/L (ref 3.7–5.3)
SODIUM BLD-SCNC: 140 MMOL/L (ref 135–144)

## 2021-07-16 PROCEDURE — 6360000002 HC RX W HCPCS: Performed by: INTERNAL MEDICINE

## 2021-07-16 PROCEDURE — 80048 BASIC METABOLIC PNL TOTAL CA: CPT

## 2021-07-16 PROCEDURE — 99239 HOSP IP/OBS DSCHRG MGMT >30: CPT | Performed by: INTERNAL MEDICINE

## 2021-07-16 PROCEDURE — 85730 THROMBOPLASTIN TIME PARTIAL: CPT

## 2021-07-16 PROCEDURE — 36415 COLL VENOUS BLD VENIPUNCTURE: CPT

## 2021-07-16 RX ORDER — BUDESONIDE AND FORMOTEROL FUMARATE DIHYDRATE 80; 4.5 UG/1; UG/1
2 AEROSOL RESPIRATORY (INHALATION) 2 TIMES DAILY
Qty: 1 INHALER | Refills: 3 | Status: SHIPPED | OUTPATIENT
Start: 2021-07-16

## 2021-07-16 RX ADMIN — HEPARIN SODIUM AND DEXTROSE 16.4 UNITS/KG/HR: 10000; 5 INJECTION INTRAVENOUS at 03:41

## 2021-07-16 ASSESSMENT — PAIN SCALES - GENERAL: PAINLEVEL_OUTOF10: 0

## 2021-07-16 NOTE — PROGRESS NOTES
Physical Therapy        Physical Therapy Cancel Note      DATE: 2021    NAME: Aline Guzmán  MRN: [id number]   : 1965      Patient not seen this date for Physical Therapy due to:    Patient independent with functional mobility. Will defer PT evaluation at this time. Please reorder PT if future needs arise. Pt denies acute PT needs.       Electronically signed by Mookie Kruse PT on 2021 at 9:52 AM

## 2021-07-16 NOTE — PROGRESS NOTES
Writer helping primary RN with care Normal vision: sees adequately in most situations; can see medication labels, newsprint

## 2021-07-16 NOTE — PROGRESS NOTES
[facility]  Internal Medicine Teaching Residency Program  Inpatient Daily Progress Note  ______________________________________________________________________________    Patient: Paco Ohcoa  YOB: 1965   DLF:[id number]    Acct: [de-identified]     Room: 2001/2001-01  Admit date: 7/13/2021  Today's date: 07/16/21  Number of days in the hospital: 3    SUBJECTIVE   Admitting Diagnosis: Atrial flutter ([address]     CC: Shortness of breath    Pt examined at bedside. Chart & results reviewed. Patient got DIANNA cardioversion yesterday. He has been stable after the procedure. Patient remained hemodynamically stable and no acute events overnight. Discharge today. ROS:  Constitutional:  negative for chills, fevers, sweats  Respiratory:  negative for cough, dyspnea on exertion, hemoptysis, shortness of breath, wheezing  Cardiovascular:  negative for chest pain, chest pressure/discomfort, lower extremity edema, palpitations  Gastrointestinal:  negative for abdominal pain, constipation, diarrhea, nausea, vomiting  Neurological:  negative for dizziness, headache    BRIEF HISTORY         The patient is a pleasant 55 y. o. male presents with PMH of exercise induced asthma a chief complaint of shortness of breath with exertion. He went to PCP who referred him to cardiologist. At the cardiology clinic, He was found to have HR of 130, and was referred to ED. In the ED, he was found to have  --> 90's, controlled on it's own. Patient stated his shortness of breath started after Moderna vaccine on may 1st. His symptoms are also associated with early fatigue  Labs showed pro-bnp 401. CXR was normal. Patient HR control on it's own for now, on lopressor 5 mg if HR > 100. Chadvasc score of 0.  Patient on heparin.     OBJECTIVE     Vital Signs:  /72   Pulse 77   Temp 98.8 °F (37.1 °C) (Oral)   Resp 17   Ht 6' (1.829 m)   Wt 209 lb 14.4 oz (95.2 kg)   SpO2 96%   BMI 28.47 kg/m²     Temp (24hrs), Av.6 °F (37 °C), Min:97.9 °F (36.6 °C), Max:98.8 °F (37.1 °C)    In: -   Out: 300 [Urine:300]    Physical Exam:  Constitutional: This is a well developed, well nourished, 25-29.9 - Overweight 60y.o. year old male who is alert, oriented, cooperative and in no apparent distress. Head:normocephalic and atraumatic. EENT:  PERRLA. No conjunctival injections. Septum was midline, mucosa was without erythema, exudates or cobblestoning. No thrush was noted. Neck: Supple without thyromegaly. No elevated JVP. Trachea was midline. Respiratory: Chest was symmetrical without dullness to percussion. Breath sounds bilaterally were clear to auscultation. There were no wheezes, rhonchi or rales. There is no intercostal retraction or use of accessory muscles. No egophony noted. Cardiovascular: Regular without murmur, clicks, gallops or rubs. Abdomen: Slightly rounded and soft without organomegaly. No rebound, rigidity or guarding was appreciated. Lymphatic: No lymphadenopathy. Musculoskeletal: Normal curvature of the spine. No gross muscle weakness. Extremities:  No lower extremity edema, ulcerations, tenderness, varicosities or erythema. Muscle size, tone and strength are normal.  No involuntary movements are noted. Skin:  Warm and dry. Good color, turgor and pigmentation. No lesions or scars.   No cyanosis or clubbing  Neurological/Psychiatric: The patient's general behavior, level of consciousness, thought content and emotional status is normal.        Medications:  Scheduled Medications:    budesonide-formoterol  2 puff Inhalation BID    sodium chloride flush  5-40 mL Intravenous 2 times per day     Continuous Infusions:    sodium chloride       PRN Medicationsmeperidine, 12.5 mg, Q5 Min PRN  fentanNYL, 25 mcg, Q5 Min PRN  sodium chloride flush, 5-40 mL, PRN  sodium chloride, 25 mL, PRN  ondansetron, 4 mg, Q8H PRN   Or  ondansetron, 4 mg, Q6H PRN  polyethylene glycol, 17 g, Daily PRN  acetaminophen, 650 mg, Q6H PRN   Or  acetaminophen, 650 mg, Q6H PRN  potassium chloride, 40 mEq, PRN   Or  potassium alternative oral replacement, 40 mEq, PRN   Or  potassium chloride, 10 mEq, PRN  metoprolol, 5 mg, Q6H PRN        Diagnostic Labs:  CBC: No results for input(s): WBC, RBC, HGB, HCT, MCV, RDW, PLT in the last 72 hours. BMP:   Recent Labs     07/13/21  1430 07/13/21  1430 07/14/21  0237 07/15/21  0235 07/16/21  0648      < > 139 138 140   K 4.3   < > 4.0 4.1 4.1      < > 106 105 107   CO2 23   < > 22 21 20   PHOS 3.5  --   --   --   --    BUN 16   < > 14 16 13   CREATININE 0.99   < > 1.07 1.06 0.87    < > = values in this interval not displayed. BNP: No results for input(s): BNP in the last 72 hours. PT/INR: No results for input(s): PROTIME, INR in the last 72 hours. APTT:   Recent Labs     07/15/21  0235 07/16/21  0648 07/16/21  0741   APTT 61.1* 65.1* 66.5*     CARDIAC ENZYMES: No results for input(s): CKMB, CKMBINDEX, TROPONINI in the last 72 hours. Invalid input(s): CKTOTAL;3  FASTING LIPID PANEL:  Lab Results   Component Value Date    CHOL 187 07/02/2020    HDL 53 07/02/2020    TRIG 87 07/02/2020     LIVER PROFILE:   Recent Labs     07/13/21  1430   AST 20   ALT 28   BILITOT 0.34   ALKPHOS 52      MICROBIOLOGY: No results found for: CULTURE    Imaging:    XR CHEST (2 VW)    Result Date: 7/13/2021  Degenerative bony changes. No evidence of acute cardiopulmonary process. ASSESSMENT & PLAN     ASSESSMENT / PLAN:     Principal Problem:    Atrial flutter (HCC)  Active Problems:    Exercise-induced asthma  Resolved Problems:    * No resolved hospital problems.  *      Atrial flutter ([address]  Plan: cardio consult placed  - HR control on it's own for now, on lopressor 5 mg if HR > 100.   - CHADVASC score 0, labs within normal limit  - CXR negative  - pr bnp elevated 401  - cardio on board, patient couldn't under go DIANNA cardioversion due to strong gag reflex. - DIANNA with cardioversion under anesthesia yesterday;  - s/p DIANNA, tolerated procedure well. Discontinued heparin, AC and ASA- discontinued. Follow up with Cardiology in 1 week. - Continue taking medication at home as prescribed.     Exercise Induced asthma  - On symbicort      DVT ppx : heparin  GI ppx: None     PT/OT: pt/ot on board  Discharge Planning / SW:  on board    Trevor Begum MD  Internal Medicine Resident, PGY-1  [address], [place]  7/16/2021, 1:28 PM    Attending Physician Statement  I have discussed the care of Joel Rawls with the resident team. I have examined the patient myself and taken ros and hpi , including pertinent history and exam findings,  with the resident. I have reviewed the key elements of all parts of the encounter with the resident. I agree with the assessment, plan and orders as documented by the resident. Principal Problem:    Atrial flutter ([address]  Active Problems:    Exercise-induced asthma  Resolved Problems:    * No resolved hospital problems.  *        Electronically signed by Jennifer Calhoun MD

## 2021-07-16 NOTE — PLAN OF CARE
Problem: Pain:  Goal: Pain level will decrease  Description: Pain level will decrease  7/16/2021 1250 by Lida Chamorro RN  Outcome: Met This Shift  7/16/2021 0745 by Lida Chamorro RN  Outcome: Ongoing  7/16/2021 0613 by Constantin Hathaway RN  Outcome: Ongoing  Goal: Control of acute pain  Description: Control of acute pain  7/16/2021 1250 by Lida Chamorro RN  Outcome: Met This Shift  7/16/2021 0745 by Lida Chamorro RN  Outcome: Ongoing  7/16/2021 0613 by Constantin Hathaway RN  Outcome: Ongoing  Goal: Control of chronic pain  Description: Control of chronic pain  7/16/2021 1250 by Lida Chamorro RN  Outcome: Met This Shift  7/16/2021 0745 by Lida Chamorro RN  Outcome: Ongoing  7/16/2021 0613 by Constantin Hathaway RN  Outcome: Ongoing

## 2021-07-16 NOTE — PROGRESS NOTES
[facility]   Progress Note                   Date:   7/16/2021  Patient name: Roseann Bruce  Date of admission:  7/13/2021 12:46 PM  MRN:   [id number]  YOB: 1965  PCP: Louie Gtz MD    Reason for Admission: Atrial flutter [facility]) [I48.92]    Subjective:       Clinical Changes / Abnormalities: Patient seen and examined with wife present at the bed side. No new acute events overnight. Patient states that this is the best that he has felt in months. He has no complaints. Denies chest pain or SOB. Reviewed vitals, labs, tele, & previous testing. Remains in SR s/p CV yesterday. Medications:   Scheduled Meds:   budesonide-formoterol  2 puff Inhalation BID    sodium chloride flush  5-40 mL Intravenous 2 times per day     Continuous Infusions:   sodium chloride      heparin (PORCINE) Infusion 16.4 Units/kg/hr (07/16/21 0341)     CBC:   Recent Labs     07/13/21  1308   WBC 6.0   HGB 15.1   PLT See Reflexed IPF Result     BMP:    Recent Labs     07/14/21  0237 07/15/21  0235 07/16/21  0648    138 140   K 4.0 4.1 4.1    105 107   CO2 22 21 20   BUN 14 16 13   CREATININE 1.07 1.06 0.87   GLUCOSE 102* 108* 98     Hepatic:   Recent Labs     07/13/21  1430   AST 20   ALT 28   BILITOT 0.34   ALKPHOS 52     Troponin:   Recent Labs     07/13/21  1430   TROPHS <6     BNP: No results for input(s): BNP in the last 72 hours. Lipids: No results for input(s): CHOL, HDL in the last 72 hours. Invalid input(s): LDLCALCU  INR: No results for input(s): INR in the last 72 hours. Objective:   Vitals: /76   Pulse 80   Temp 98.8 °F (37.1 °C) (Oral)   Resp 24   Ht 6' (1.829 m)   Wt 209 lb 14.4 oz (95.2 kg)   SpO2 96%   BMI 28.47 kg/m²   General appearance: alert and cooperative with exam  HEENT: Head: Normocephalic, no lesions, without obvious abnormality.   Neck: no JVD, trachea midline, no adenopathy  Lungs: Clear to auscultation  Heart: Regular rate and rhythm, s1/s2 auscultated, no murmurs. SR on tele Abdomen: soft, non-tender, bowel sounds active  Extremities: no edema  Neurologic: not done        Assessment / Acute Cardiac Problems:   1. Newly diagnosed  Atrial flutter: -130s, symptomatic  2. Shortness of breath: likely due to #1  3. Easy fatigability: likely due to #1  4. CHADS-Vasc score of 0    Patient Active Problem List:     Otitis media     Tendonitis of shoulder     Fatigue     Herniated disc, cervical     Bilateral carotid artery stenosis     Atrial flutter (HCC)     Exercise-induced asthma    [id number] Score for Atrial Fibrillation Stroke Risk   Risk   Factors  Component Value   C CHF No 0   H HTN No 0   A2 Age >= 75 No,  (56 y.o.) 0   D DM No 0   S2 Prior Stroke/TIA No 0   V Vascular Disease No 0   A Age 74-69 No,  (56 y.o.) 0   Sc Sex male 0    [id number]  Score  0   Score last updated 7/15/21 19:04 AM EDT    Click here for a link to the UpToDate guideline \"Atrial Fibrillation: Anticoagulation therapy to prevent embolization    Disclaimer: Risk Score calculation is dependent on accuracy of patient problem list and past encounter diagnosis. Plan of Treatment:   1. S/P DIANNA and CV yesterday. Remains in SR.    2. Will discontinue heparin drip. CHADVASC 0. No AC indicated. 3. No objection to discharge today. Follow up in clinic in 2 weeks as scheduled.       Electronically signed by FELY Aguilar CNP on 7/16/2021 at 9:06 AM  [facility]      [phone]

## 2021-07-16 NOTE — PLAN OF CARE
Problem: Pain:  Goal: Pain level will decrease  Description: Pain level will decrease  7/16/2021 0745 by Chandrakant Stark RN  Outcome: Ongoing  7/16/2021 0613 by Efrain Overton RN  Outcome: Ongoing  Goal: Control of acute pain  Description: Control of acute pain  7/16/2021 0745 by Chandrakant Stark RN  Outcome: Ongoing  7/16/2021 0613 by Efrain Overton RN  Outcome: Ongoing  Goal: Control of chronic pain  Description: Control of chronic pain  7/16/2021 0745 by Chandrakant Stark RN  Outcome: Ongoing  7/16/2021 0613 by Efrain Overton RN  Outcome: Ongoing

## 2021-07-17 NOTE — DISCHARGE SUMMARY
Medication List as of 7/16/2021  2:22 PM        START taking these medications    Details   budesonide-formoterol (SYMBICORT) 80-4.5 MCG/ACT AERO Inhale 2 puffs into the lungs 2 times daily, Disp-1 Inhaler, R-3Normal           CONTINUE these medications which have NOT CHANGED    Details   Multiple Vitamins-Minerals (THERAPEUTIC MULTIVITAMIN-MINERALS) tablet Take 1 tablet by mouth dailyHistorical Med           STOP taking these medications       fluticasone-salmeterol (ADVAIR) 250-50 MCG/DOSE AEPB Comments:   Reason for Stopping:               Activity: activity as tolerated    Diet: regular diet    Follow-up:    FELY Hidalgo - CNP  [address] [facility] [id number] [phone]    On 7/26/2021  1:30pm    Carmen Madden MD  [address] [address]  [phone]      post hospital admisson      Patient Instructions: F/U with cardiology after 1 week. Continue taking medication as prescribed by physician. F/U with cardiology after 1 week. Follow up labs: Follow up imaging:     Makayla Razo MD, MD  Internal Medicine Resident, PGY-1  [facility]; [place], [place]  7/17/2021, 2:51 PM    Attending Physician Statement  I have discussed the care of Abel Oquendo and I have examined the patient myselft and taken ros and hpi , including pertinent history and exam findings,  with the resident. I have reviewed the key elements of all parts of the encounter with the resident. I agree with the assessment, plan and orders as documented by the resident. I spent approx 35 mins in direct patient care as above and discussing discharge with patient, reviewing medications and counseling for discharge .     Electronically signed by Mohinder Gibson MD

## 2021-07-19 ENCOUNTER — CARE COORDINATION (OUTPATIENT)
Dept: CASE MANAGEMENT | Age: 56
End: 2021-07-19

## 2021-07-19 DIAGNOSIS — I48.92 ATRIAL FLUTTER, UNSPECIFIED TYPE (HCC): Primary | ICD-10-CM

## 2021-07-19 NOTE — CARE COORDINATION
[facility] Transitions Initial Follow Up Call    Call within 2 business days of discharge: Yes    Patient: Ranulfo Ford Patient : 1965   MRN: [id number]  Reason for Admission: Atrial Flutter  Discharge Date: 21 RARS: Readmission Risk Score: 9      Last Discharge [facility]       Complaint Diagnosis Description Type Department Provider    21 Fatigue Atrial flutter, unspecified type [facility]) ED to Hosp-Admission (Discharged) (ADMITTED) [facility] CAR 2 Lincoln Denise MD; [facility]. .. Attempted initial 24 hour transitional call to patient. Left VM to return call directly to [phone]. 1st attempt.      Facility: [facility]        Follow Up  Future Appointments   Date Time Provider [place] Dave   2021  8:00 AM Vincenzo Ocampo MD [facility] None       Dayanna Rutherford RN

## 2021-07-19 NOTE — CARE COORDINATION
[facility] Transitions Initial Follow Up Call    Call within 2 business days of discharge: Yes    Patient: Tomas Ards Patient : 1965   MRN: [id number]  Reason for Admission: Atrial Flutter  Discharge Date: 21 RARS: Readmission Risk Score: 9      Last Discharge [facility]       Complaint Diagnosis Description Type Department Provider    21 Fatigue Atrial flutter, unspecified type [facility]) ED to Hosp-Admission (Discharged) (ADMITTED) [facility] CAR 2 Josue Peters MD; Rupal Keyes. .. Spoke with: Patient    Facility: [facility]    Non-face-to-face services provided:  Scheduled appointment with PCP-  Scheduled appointment with [address] cardiology  Obtained and reviewed discharge summary and/or continuity of care documents     Patient feeling \"great\" today. Outside trimming grass, denies fatigue or SPENCER as he had prior to DIANNA. Does not feel need for Symbicort and did not . States he has exercise induced asthma, has not had exacerbation in several years. Has follow up with PCP and cardiology next week, does not feel need to move up appointment. HR today 70-80 range, no cp, palpitations, fatigue. Denies needs, had both COVID vaccines    Transitions of Care Initial Call    Was this an external facility discharge? No Discharge Facility: [facility]    Challenges to be reviewed by the provider   Additional needs identified to be addressed with provider: No  none             Method of communication with provider : none      Advance Care Planning:   Does patient have an Advance Directive: reviewed and current. Was this a readmission? No  Patient stated reason for admission: fatigue, elevated HR  Patients top risk factors for readmission: medical condition-A flutter    Care Transition Nurse (CTN) contacted the patient by telephone to perform post hospital discharge assessment. Verified name and  with patient as identifiers.  Provided introduction to self, and explanation of the CTN role. CTN reviewed discharge instructions, medical action plan and red flags with patient who verbalized understanding. Patient given an opportunity to ask questions and does not have any further questions or concerns at this time. Were discharge instructions available to patient? Yes. Reviewed appropriate site of care based on symptoms and resources available to patient including: PCP and Specialist. The patient agrees to contact the PCP office for questions related to their healthcare. Medication reconciliation was performed with patient, who verbalizes understanding of administration of home medications. Advised obtaining a 90-day supply of all daily and as-needed medications. Covid Risk Education     Educated patient about risk for severe COVID-19 due to risk factors according to CDC guidelines. CTN reviewed discharge instructions, medical action plan and red flag symptoms with the patient who verbalized understanding. Discussed COVID vaccination status: Yes. Education provided on COVID-19 vaccination as appropriate. Discussed exposure protocols and quarantine with CDC Guidelines. Patient was given an opportunity to verbalize any questions and concerns and agrees to contact CTN or health care provider for questions related to their healthcare. Reviewed and educated patient on any new and changed medications related to discharge diagnosis. Was patient discharged with a pulse oximeter? No     CTN provided contact information. Plan for follow-up call in 5-7 days based on severity of symptoms and risk factors.   Plan for next call: routine followup         Care Transitions 24 Hour Call    Schedule Follow Up Appointment with PCP: Completed  Do you have any ongoing symptoms?: No  Do you have a copy of your discharge instructions?: Yes  Do you have all of your prescriptions and are they filled?: No  Have you been contacted by a [id number] Pharmacist?: No  Have you scheduled your follow up appointment?: Yes  How are you going to get to your appointment?: Car - drive self  Were you discharged with any Home Care or Post Acute Services: No  Do you feel like you have everything you need to keep you well at home?: Yes  Care Transitions Interventions         Follow Up  Future Appointments   Date Time Provider [place] Dave   7/29/2021  8:00 AM Reynold Resendiz MD [facility] None       Zoë Burrows RN

## 2021-07-27 ENCOUNTER — CARE COORDINATION (OUTPATIENT)
Dept: CASE MANAGEMENT | Age: 56
End: 2021-07-27

## 2021-07-27 NOTE — CARE COORDINATION
[facility] Transitions Follow Up Call    2021    Patient: Ana Ramirez  Patient : 1965   MRN: [id number]  Reason for Admission: Atrial flutter  Discharge Date: 21 RARS: Readmission Risk Score: 9         Attempted to reach patient for subsequent transitional call. VM left to return call to [phone].   1st attempt      Follow Up  Future Appointments   Date Time Provider [place] Whitfieldi   2021  8:00 AM Britany Rajan MD [facility] None       Gopi Rodrigez RN

## 2021-07-28 ENCOUNTER — CARE COORDINATION (OUTPATIENT)
Dept: CASE MANAGEMENT | Age: 56
End: 2021-07-28

## 2021-07-28 NOTE — CARE COORDINATION
[facility] Transitions Follow Up Call    2021    Patient: Shemar Brooks  Patient : 1965   MRN: [id number]  Reason for Admission: Atrial flutter      Discharge Date: 21 RARS: Readmission Risk Score: 9         Spoke with: Patient    Patient feeling well today. Denies any chest pain, palpitations, shortness of breath or other symptoms. Reported some runny stools last week to PCP but this has resolved. He was seen by cardiology on Monday, no med changes, RTC in 6 months  PCP follow up tomorrow. Denies any needs or concerns. Care Transitions Follow Up Call    Needs to be reviewed by the provider   Additional needs identified to be addressed with provider: No  none             Method of communication with provider : none      Care Transition Nurse (CTN) contacted the patient by telephone to follow up after admission on . Verified name and  with patient as identifiers. Addressed changes since last contact: none  Discussed follow-up appointments. Advance Care Planning:   Does patient have an Advance Directive: reviewed and current. CTN reviewed discharge instructions, medical action plan and red flags with patient and discussed any barriers to care and/or understanding of plan of care after discharge. Discussed appropriate site of care based on symptoms and resources available to patient including: PCP and Specialist. The patient agrees to contact the PCP office for questions related to their healthcare. Patients top risk factors for readmission: medical condition-A Flutter  Interventions to address risk factors: Obtained and reviewed discharge summary and/or continuity of care documents      Non[facility] follow up appointment(s):  with PCP      CTN provided contact information for future needs. Plan for follow-up call in 7-10 days based on severity of symptoms and risk factors.   Plan for next call: routine follow up            Care Transitions Subsequent and Final Call Schedule Follow Up Appointment with PCP: Completed  Subsequent and Final Calls  Do you have any ongoing symptoms?: No  Have your medications changed?: No  Do you have any questions related to your medications?: No  Do you currently have any active services?: No  Do you have any needs or concerns that I can assist you with?: No  Identified Barriers: Lack of Education  Care Transitions Interventions  Other Interventions:            Follow Up  Future Appointments   Date Time Provider [place] Dave   7/29/2021  8:00 AM Renzo Sumner MD [facility] None       Kim Pineda RN

## 2021-07-29 ENCOUNTER — HOSPITAL ENCOUNTER (OUTPATIENT)
Dept: GENERAL RADIOLOGY | Facility: CLINIC | Age: 56
Discharge: HOME | End: 2021-07-31
Payer: COMMERCIAL

## 2021-07-29 DIAGNOSIS — M79.644 PAIN OF FINGER OF RIGHT HAND: ICD-10-CM

## 2021-07-29 PROCEDURE — 73130 X-RAY EXAM OF HAND: CPT

## 2021-08-04 ENCOUNTER — CARE COORDINATION (OUTPATIENT)
Dept: CASE MANAGEMENT | Age: 56
End: 2021-08-04

## 2021-08-04 NOTE — CARE COORDINATION
future needs. No further follow-up call indicated based on severity of symptoms and risk factors. Plan for next call: Denies any further needs, has had follow up appointments, returned to work, no new issues. Care Transitions Subsequent and Final Call    Schedule Follow Up Appointment with PCP: Completed  Subsequent and Final Calls  Do you have any ongoing symptoms?: Yes  Onset of Patient-reported symptoms: In the past 7 days  Patient-reported symptoms: Pain  Have your medications changed?: No  Do you have any questions related to your medications?: No  Do you currently have any active services?: No  Do you have any needs or concerns that I can assist you with?: No  Identified Barriers: Lack of Education  Care Transitions Interventions  Other Interventions: Follow Up  No future appointments.     Nataliia Stewart RN

## 2021-09-16 ENCOUNTER — HOSPITAL ENCOUNTER (OUTPATIENT)
Age: 56
Setting detail: SPECIMEN
Discharge: HOME | End: 2021-09-16
Payer: COMMERCIAL

## 2021-09-17 LAB
ADRENOCORTICOTROPIC HORMONE: 26 PG/ML (ref 7–69)
DHEAS (DHEA SULFATE): 267 UG/DL (ref 70–310)

## 2021-09-20 LAB
ALDOSTERONE COMMENT: NORMAL
ALDOSTERONE: 7.6 NG/DL
CORTISOL SALIVARY: 0.06 UG/DL

## 2021-09-21 ENCOUNTER — HOSPITAL ENCOUNTER (OUTPATIENT)
Age: 56
Setting detail: SPECIMEN
Discharge: HOME | End: 2021-09-21
Payer: COMMERCIAL

## 2021-09-21 LAB
CORTISOL COLLECTION INFO: NORMAL
CORTISOL: 3.2 UG/DL (ref 2.7–18.4)
METANEPH/PLASMA INTERP: NORMAL
METANEPHRINE: 0.22 NMOL/L (ref 0–0.49)
NORMETANEPHRINE PLASMA: 0.47 NMOL/L (ref 0–0.89)

## 2021-09-22 LAB
RENIN ACTIVITY: 0.5 NG/ML/HR
RENIN COMMENT: NORMAL

## 2021-09-25 LAB — DEXAMETHASONE: 290.9 NG/DL

## 2021-11-05 ENCOUNTER — HOSPITAL ENCOUNTER (OUTPATIENT)
Age: 56
Setting detail: SPECIMEN
Discharge: HOME | End: 2021-11-05
Payer: COMMERCIAL

## 2021-11-05 LAB — POTASSIUM SERPL-SCNC: 4.5 MMOL/L (ref 3.7–5.3)

## 2021-11-16 ENCOUNTER — HOSPITAL ENCOUNTER (OUTPATIENT)
Age: 56
Setting detail: SPECIMEN
Discharge: HOME | End: 2021-11-16

## 2021-11-16 LAB
CREATININE TIMED UR: NORMAL MG/ X H
CREATININE URINE: 69.2 MG/DL
CREATININE, 24H UR: 1744 MG/24 H (ref 1040–2350)
HOURS COLLECTED: 24 H
POTASSIUM 24 HOUR URINE: 78 MMOL/24 H (ref 25–125)
POTASSIUM TIMED UR: NORMAL MMOL/X H
POTASSIUM, UR: 31 MMOL/L
SODIUM 24 HOUR URINE: 255 MMOL/24 H (ref 40–220)
SODIUM TIMED UR: ABNORMAL MMOL/X H
SODIUM URINE: 101 MMOL/L
VOLUME: 2520 ML

## 2021-11-18 LAB — CORTISOL SALIVARY: 0.15 UG/DL

## 2021-11-21 LAB
CORTISOL (UR), FREE: 31.8 UG/D
CORTISOL URINE, FREE (/G CRT): 12.6 UG/L
CORTISOL, URINE RATIO CREATININE: 20.32 UG/G CRT
CORTISOL, URINE: NORMAL
CREATININE URINE /24 HR: 1562 MG/D (ref 800–2100)
CREATININE URINE /VOLUME: 62 MG/DL
HOURS COLLECTED: 24
URINE VOLUME: 2520

## 2022-01-19 ENCOUNTER — HOSPITAL ENCOUNTER (OUTPATIENT)
Age: 57
Setting detail: SPECIMEN
Discharge: HOME | End: 2022-01-19

## 2022-01-19 DIAGNOSIS — E78.5 HYPERLIPIDEMIA, UNSPECIFIED HYPERLIPIDEMIA TYPE: ICD-10-CM

## 2022-01-19 DIAGNOSIS — R35.1 NOCTURIA: ICD-10-CM

## 2022-01-19 DIAGNOSIS — E55.9 VITAMIN D DEFICIENCY: ICD-10-CM

## 2022-01-19 DIAGNOSIS — R53.83 OTHER FATIGUE: ICD-10-CM

## 2022-01-19 LAB
ABSOLUTE EOS #: 0.19 K/UL (ref 0–0.44)
ABSOLUTE IMMATURE GRANULOCYTE: <0.03 K/UL (ref 0–0.3)
ABSOLUTE LYMPH #: 1.5 K/UL (ref 1.1–3.7)
ABSOLUTE MONO #: 0.47 K/UL (ref 0.1–1.2)
ALT SERPL-CCNC: 39 U/L (ref 5–41)
ANION GAP SERPL CALCULATED.3IONS-SCNC: 15 MMOL/L (ref 9–17)
AST SERPL-CCNC: 36 U/L
BASOPHILS # BLD: 1 % (ref 0–2)
BASOPHILS ABSOLUTE: 0.03 K/UL (ref 0–0.2)
BUN BLDV-MCNC: 16 MG/DL (ref 6–20)
BUN/CREAT BLD: ABNORMAL (ref 9–20)
CALCIUM SERPL-MCNC: 9.3 MG/DL (ref 8.6–10.4)
CHLORIDE BLD-SCNC: 108 MMOL/L (ref 98–107)
CHOLESTEROL/HDL RATIO: 3.1
CHOLESTEROL: 184 MG/DL
CO2: 21 MMOL/L (ref 20–31)
CREAT SERPL-MCNC: 0.95 MG/DL (ref 0.7–1.2)
DIFFERENTIAL TYPE: NORMAL
EOSINOPHILS RELATIVE PERCENT: 4 % (ref 1–4)
GFR AFRICAN AMERICAN: >60 ML/MIN
GFR NON-AFRICAN AMERICAN: >60 ML/MIN
GFR SERPL CREATININE-BSD FRML MDRD: ABNORMAL ML/MIN/{1.73_M2}
GFR SERPL CREATININE-BSD FRML MDRD: ABNORMAL ML/MIN/{1.73_M2}
GLUCOSE FASTING: 100 MG/DL (ref 70–99)
HCT VFR BLD CALC: 41.9 % (ref 40.7–50.3)
HDLC SERPL-MCNC: 60 MG/DL
HEMOGLOBIN: 14.1 G/DL (ref 13–17)
IMMATURE GRANULOCYTES: 0 %
LDL CHOLESTEROL: 109 MG/DL (ref 0–130)
LYMPHOCYTES # BLD: 30 % (ref 24–43)
MCH RBC QN AUTO: 32.6 PG (ref 25.2–33.5)
MCHC RBC AUTO-ENTMCNC: 33.7 G/DL (ref 28.4–34.8)
MCV RBC AUTO: 96.8 FL (ref 82.6–102.9)
MONOCYTES # BLD: 9 % (ref 3–12)
NRBC AUTOMATED: 0 PER 100 WBC
PDW BLD-RTO: 12.8 % (ref 11.8–14.4)
PLATELET # BLD: 241 K/UL (ref 138–453)
PLATELET ESTIMATE: NORMAL
PMV BLD AUTO: 10.6 FL (ref 8.1–13.5)
POTASSIUM SERPL-SCNC: 4.7 MMOL/L (ref 3.7–5.3)
PROSTATE SPECIFIC ANTIGEN: 0.56 UG/L
RBC # BLD: 4.33 M/UL (ref 4.21–5.77)
RBC # BLD: NORMAL 10*6/UL
SEG NEUTROPHILS: 56 % (ref 36–65)
SEGMENTED NEUTROPHILS ABSOLUTE COUNT: 2.8 K/UL (ref 1.5–8.1)
SODIUM BLD-SCNC: 144 MMOL/L (ref 135–144)
THYROXINE, FREE: 1.28 NG/DL (ref 0.93–1.7)
TRIGL SERPL-MCNC: 77 MG/DL
TSH SERPL DL<=0.05 MIU/L-ACNC: 1.06 MIU/L (ref 0.3–5)
VITAMIN D 25-HYDROXY: 55.1 NG/ML (ref 30–100)
VLDLC SERPL CALC-MCNC: NORMAL MG/DL (ref 1–30)
WBC # BLD: 5 K/UL (ref 3.5–11.3)
WBC # BLD: NORMAL 10*3/UL

## 2022-02-23 ENCOUNTER — HOSPITAL ENCOUNTER (OUTPATIENT)
Age: 57
Setting detail: SPECIMEN
Discharge: HOME | End: 2022-02-23

## 2022-02-23 LAB
CORTISOL COLLECTION INFO: NORMAL
CORTISOL: 12.7 UG/DL (ref 2.7–18.4)

## 2022-02-24 LAB — ADRENOCORTICOTROPIC HORMONE: 14 PG/ML (ref 7–69)

## 2022-02-28 LAB — DEXAMETHASONE: <50 NG/DL

## 2022-03-09 ENCOUNTER — HOSPITAL ENCOUNTER (OUTPATIENT)
Age: 57
Setting detail: SPECIMEN
Discharge: HOME | End: 2022-03-09

## 2022-03-10 ENCOUNTER — HOSPITAL ENCOUNTER (OUTPATIENT)
Age: 57
Setting detail: SPECIMEN
Discharge: HOME | End: 2022-03-10

## 2022-03-10 LAB — ADRENOCORTICOTROPIC HORMONE: 21 PG/ML (ref 7–69)

## 2022-03-11 LAB
11 DEOXYCORTICOSTERONE: <5 NG/DL
CORTISOL COLLECTION INFO: ABNORMAL
CORTISOL: 1.2 UG/DL (ref 2.7–18.4)

## 2022-03-19 LAB — DEXAMETHASONE: 220.3 NG/DL

## 2024-01-22 ENCOUNTER — HOSPITAL ENCOUNTER (OUTPATIENT)
Age: 59
Setting detail: OBSERVATION
Discharge: HOME | End: 2024-01-23
Attending: EMERGENCY MEDICINE | Admitting: EMERGENCY MEDICINE
Payer: COMMERCIAL

## 2024-01-22 ENCOUNTER — APPOINTMENT (OUTPATIENT)
Dept: GENERAL RADIOLOGY | Age: 59
End: 2024-01-22
Payer: COMMERCIAL

## 2024-01-22 DIAGNOSIS — I48.92 ATRIAL FLUTTER (HCC): ICD-10-CM

## 2024-01-22 DIAGNOSIS — I48.92 ATRIAL FLUTTER, UNSPECIFIED TYPE (HCC): Primary | ICD-10-CM

## 2024-01-22 LAB
ANION GAP SERPL CALCULATED.3IONS-SCNC: 13 MMOL/L (ref 9–17)
BASOPHILS # BLD: 0.05 K/UL (ref 0–0.2)
BASOPHILS NFR BLD: 1 % (ref 0–2)
BNP SERPL-MCNC: 371 PG/ML
BUN SERPL-MCNC: 15 MG/DL (ref 8–23)
CALCIUM SERPL-MCNC: 9.2 MG/DL (ref 8.6–10.4)
CHLORIDE SERPL-SCNC: 105 MMOL/L (ref 98–107)
CO2 SERPL-SCNC: 23 MMOL/L (ref 20–31)
CREAT SERPL-MCNC: 1.1 MG/DL (ref 0.7–1.2)
EOSINOPHIL # BLD: 0.25 K/UL (ref 0–0.44)
EOSINOPHILS RELATIVE PERCENT: 3 % (ref 1–4)
ERYTHROCYTE [DISTWIDTH] IN BLOOD BY AUTOMATED COUNT: 13.6 % (ref 11.8–14.4)
GFR SERPL CREATININE-BSD FRML MDRD: >60 ML/MIN/1.73M2
GLUCOSE SERPL-MCNC: 99 MG/DL (ref 70–99)
HCT VFR BLD AUTO: 45.2 % (ref 40.7–50.3)
HGB BLD-MCNC: 14.9 G/DL (ref 13–17)
IMM GRANULOCYTES # BLD AUTO: <0.03 K/UL (ref 0–0.3)
IMM GRANULOCYTES NFR BLD: 0 %
INR PPP: 1
LYMPHOCYTES NFR BLD: 2.42 K/UL (ref 1.1–3.7)
LYMPHOCYTES RELATIVE PERCENT: 31 % (ref 24–43)
MAGNESIUM SERPL-MCNC: 2.7 MG/DL (ref 1.6–2.6)
MCH RBC QN AUTO: 32 PG (ref 25.2–33.5)
MCHC RBC AUTO-ENTMCNC: 33 G/DL (ref 28.4–34.8)
MCV RBC AUTO: 97 FL (ref 82.6–102.9)
MONOCYTES NFR BLD: 0.61 K/UL (ref 0.1–1.2)
MONOCYTES NFR BLD: 8 % (ref 3–12)
NEUTROPHILS NFR BLD: 57 % (ref 36–65)
NEUTS SEG NFR BLD: 4.48 K/UL (ref 1.5–8.1)
NRBC BLD-RTO: 0 PER 100 WBC
PARTIAL THROMBOPLASTIN TIME: 26.8 SEC (ref 23–36.5)
PLATELET # BLD AUTO: 270 K/UL (ref 138–453)
PMV BLD AUTO: 10.6 FL (ref 8.1–13.5)
POTASSIUM SERPL-SCNC: 4.2 MMOL/L (ref 3.7–5.3)
PROTHROMBIN TIME: 13.1 SEC (ref 11.7–14.9)
RBC # BLD AUTO: 4.66 M/UL (ref 4.21–5.77)
SODIUM SERPL-SCNC: 141 MMOL/L (ref 135–144)
TROPONIN I SERPL HS-MCNC: 10 NG/L (ref 0–22)
WBC OTHER # BLD: 7.8 K/UL (ref 3.5–11.3)

## 2024-01-22 PROCEDURE — 83880 ASSAY OF NATRIURETIC PEPTIDE: CPT

## 2024-01-22 PROCEDURE — 85610 PROTHROMBIN TIME: CPT

## 2024-01-22 PROCEDURE — 93005 ELECTROCARDIOGRAM TRACING: CPT

## 2024-01-22 PROCEDURE — 85025 COMPLETE CBC W/AUTO DIFF WBC: CPT

## 2024-01-22 PROCEDURE — 85730 THROMBOPLASTIN TIME PARTIAL: CPT

## 2024-01-22 PROCEDURE — G0378 HOSPITAL OBSERVATION PER HR: HCPCS

## 2024-01-22 PROCEDURE — 83735 ASSAY OF MAGNESIUM: CPT

## 2024-01-22 PROCEDURE — 80048 BASIC METABOLIC PNL TOTAL CA: CPT

## 2024-01-22 PROCEDURE — 84484 ASSAY OF TROPONIN QUANT: CPT

## 2024-01-22 PROCEDURE — 2580000003 HC RX 258

## 2024-01-22 PROCEDURE — 71046 X-RAY EXAM CHEST 2 VIEWS: CPT

## 2024-01-22 PROCEDURE — 99285 EMERGENCY DEPT VISIT HI MDM: CPT

## 2024-01-22 RX ORDER — GABAPENTIN 100 MG/1
100 CAPSULE ORAL DAILY
Status: DISCONTINUED | OUTPATIENT
Start: 2024-01-23 | End: 2024-01-23 | Stop reason: HOSPADM

## 2024-01-22 RX ORDER — SODIUM CHLORIDE 0.9 % (FLUSH) 0.9 %
5-40 SYRINGE (ML) INJECTION PRN
Status: DISCONTINUED | OUTPATIENT
Start: 2024-01-22 | End: 2024-01-23 | Stop reason: HOSPADM

## 2024-01-22 RX ORDER — 0.9 % SODIUM CHLORIDE 0.9 %
1000 INTRAVENOUS SOLUTION INTRAVENOUS ONCE
Status: COMPLETED | OUTPATIENT
Start: 2024-01-22 | End: 2024-01-22

## 2024-01-22 RX ORDER — ACETAMINOPHEN 325 MG/1
650 TABLET ORAL EVERY 4 HOURS PRN
Status: DISCONTINUED | OUTPATIENT
Start: 2024-01-22 | End: 2024-01-23 | Stop reason: HOSPADM

## 2024-01-22 RX ORDER — ONDANSETRON 4 MG/1
4 TABLET, ORALLY DISINTEGRATING ORAL EVERY 8 HOURS PRN
Status: DISCONTINUED | OUTPATIENT
Start: 2024-01-22 | End: 2024-01-23 | Stop reason: HOSPADM

## 2024-01-22 RX ORDER — ASPIRIN 81 MG/1
81 TABLET, CHEWABLE ORAL DAILY
Status: DISCONTINUED | OUTPATIENT
Start: 2024-01-23 | End: 2024-01-23 | Stop reason: HOSPADM

## 2024-01-22 RX ORDER — ENOXAPARIN SODIUM 100 MG/ML
40 INJECTION SUBCUTANEOUS DAILY
Status: DISCONTINUED | OUTPATIENT
Start: 2024-01-23 | End: 2024-01-23

## 2024-01-22 RX ORDER — SODIUM CHLORIDE 0.9 % (FLUSH) 0.9 %
5-40 SYRINGE (ML) INJECTION EVERY 12 HOURS SCHEDULED
Status: DISCONTINUED | OUTPATIENT
Start: 2024-01-22 | End: 2024-01-23 | Stop reason: HOSPADM

## 2024-01-22 RX ORDER — ONDANSETRON 2 MG/ML
4 INJECTION INTRAMUSCULAR; INTRAVENOUS EVERY 6 HOURS PRN
Status: DISCONTINUED | OUTPATIENT
Start: 2024-01-22 | End: 2024-01-23 | Stop reason: HOSPADM

## 2024-01-22 RX ORDER — SODIUM CHLORIDE 9 MG/ML
INJECTION, SOLUTION INTRAVENOUS PRN
Status: DISCONTINUED | OUTPATIENT
Start: 2024-01-22 | End: 2024-01-23 | Stop reason: HOSPADM

## 2024-01-22 RX ADMIN — SODIUM CHLORIDE 1000 ML: 9 INJECTION, SOLUTION INTRAVENOUS at 17:44

## 2024-01-22 RX ADMIN — SODIUM CHLORIDE, PRESERVATIVE FREE 5 ML: 5 INJECTION INTRAVENOUS at 22:15

## 2024-01-22 ASSESSMENT — ENCOUNTER SYMPTOMS
WHEEZING: 0
VOMITING: 0
SHORTNESS OF BREATH: 1
ABDOMINAL PAIN: 0
COUGH: 0
CHEST TIGHTNESS: 1
DIARRHEA: 0
NAUSEA: 0

## 2024-01-22 NOTE — ED PROVIDER NOTES
FACULTY SIGN-OUT  ADDENDUM       Patient: Chin Vergara   MRN: [id number]  PCP:  Hitesh Ybarra MD  Note Started: 1/22/24, 6:19 PM EST  Attestation  I was available and discussed any additional care issues that arose and coordinated the management plans with the resident(s) caring for the patient during my duty period. Any areas of disagreement with resident's documentation of care or procedures are noted on the chart. I was personally present for the key portions of any/all procedures during my duty period. I have documented in the chart those procedures where I was not present during the key portions.   The patient's initial evaluation and plan have been discussed with the prior provider who initially evaluated the patient.      Pertinent Comments:  The patient is a 58 y.o. male taken in signout with distant history of a flutter/fib after COVID vaccination and now had a flu vaccination recently with feeling that he is back in this.   On EKG found to be in atrial flutter with slight variable block and a spontaneously rate controlled  We are awaiting laboratories with extended electrolytes as well as consultation with cardiology and likely admission and anticoagulant    ED COURSE      The patient was given the following medications:  Orders Placed This Encounter   Medications    sodium chloride 0.9 % bolus 1,000 mL       RECENT VITALS:   BP: 128/78  Pulse: 92  Respirations: 19  Temp: 97 °F (36.1 °C) SpO2: 100 %    (Please note that portions of this note were completed with a voice recognition program.  Efforts were made to edit the dictations but occasionally words are mis-transcribed.)    Santino Pearson MD [facility]  Attending Emergency Medicine Physician       Santino Pearson MD  01/22/24 0726

## 2024-01-22 NOTE — ED TRIAGE NOTES
Pt states he was sent by PCP with tachycardia and an abnormal EKG, pt is A+Ox4, pt ambulates into triage with a steady gait

## 2024-01-22 NOTE — ED PROVIDER NOTES
[facility] ED     Emergency Department     Faculty Attestation    I performed a history and physical examination of the patient and discussed management with the resident. I reviewed the resident’s note and agree with the documented findings and plan of care. Any areas of disagreement are noted on the chart. I was personally present for the key portions of any procedures. I have documented in the chart those procedures where I was not present during the key portions. I have reviewed the emergency nurses triage note. I agree with the chief complaint, past medical history, past surgical history, allergies, medications, social and family history as documented unless otherwise noted below. For Physician Assistant/ Nurse Practitioner cases/documentation I have personally evaluated this patient and have completed at least one if not all key elements of the E/M (history, physical exam, and MDM). Additional findings are as noted.    5:56 PM EST    History of A-flutter/fibrillation in the past presents with shortness of breath and dizziness that has had off-and-on for the past few days.  Patient states that the A-flutter was diagnosed after COVID shot back in 2020.  He had a cardioversion at that time and has been well ever since.  Patient was seen in his primary care physician's office today and was found to be in a flutter so was sent here for further evaluation.  Patient denies any chest pain.  He denies any fever.  He says he was recently treated for bronchitis but those symptoms have been improving.  On exam, patient is resting comfortably in the bed and appears well.  He is alert and oriented and answering questions appropriately.  Lungs are clear to auscultation bilaterally.  Abdomen is soft and nontender.  The bilateral calves are nontender nonswollen.  Will get EKG, chest x-ray, labs.  Will plan to admit patient for further cardiology evaluation.    EKG Interpretation    Interpreted by emergency  department physician    Rhythm: atrial flutter  Rate: 100-110  Axis: normal  Ectopy: none  Conduction: normal  ST Segments: nonspecific changes  T Waves: non specific changes  Q Waves: none    Clinical Impression: non-specific EKG, aflutter    MD Taty Alexandre MD  Attending Emergency  Physician

## 2024-01-23 ENCOUNTER — HOSPITAL ENCOUNTER (OUTPATIENT)
Age: 59
Setting detail: OBSERVATION
Discharge: HOME | End: 2024-01-25
Payer: COMMERCIAL

## 2024-01-23 VITALS
TEMPERATURE: 97.7 F | RESPIRATION RATE: 18 BRPM | SYSTOLIC BLOOD PRESSURE: 117 MMHG | OXYGEN SATURATION: 99 % | BODY MASS INDEX: 27.9 KG/M2 | DIASTOLIC BLOOD PRESSURE: 89 MMHG | HEIGHT: 72 IN | WEIGHT: 206 LBS | HEART RATE: 68 BPM

## 2024-01-23 LAB
ECHO BSA: 2.18 M2
EKG ATRIAL RATE: 263 BPM
EKG P AXIS: -80 DEGREES
EKG Q-T INTERVAL: 400 MS
EKG QRS DURATION: 110 MS
EKG QTC CALCULATION (BAZETT): 458 MS
EKG R AXIS: 17 DEGREES
EKG T AXIS: 10 DEGREES
EKG VENTRICULAR RATE: 79 BPM

## 2024-01-23 PROCEDURE — 93005 ELECTROCARDIOGRAM TRACING: CPT | Performed by: EMERGENCY MEDICINE

## 2024-01-23 PROCEDURE — 2580000003 HC RX 258

## 2024-01-23 PROCEDURE — 93225 XTRNL ECG REC<48 HRS REC: CPT

## 2024-01-23 PROCEDURE — 93010 ELECTROCARDIOGRAM REPORT: CPT | Performed by: INTERNAL MEDICINE

## 2024-01-23 PROCEDURE — 6370000000 HC RX 637 (ALT 250 FOR IP)

## 2024-01-23 PROCEDURE — 99254 IP/OBS CNSLTJ NEW/EST MOD 60: CPT | Performed by: INTERNAL MEDICINE

## 2024-01-23 PROCEDURE — 96361 HYDRATE IV INFUSION ADD-ON: CPT

## 2024-01-23 PROCEDURE — 96360 HYDRATION IV INFUSION INIT: CPT

## 2024-01-23 PROCEDURE — G0378 HOSPITAL OBSERVATION PER HR: HCPCS

## 2024-01-23 RX ORDER — 0.9 % SODIUM CHLORIDE 0.9 %
1000 INTRAVENOUS SOLUTION INTRAVENOUS ONCE
Status: COMPLETED | OUTPATIENT
Start: 2024-01-23 | End: 2024-01-23

## 2024-01-23 RX ADMIN — METOPROLOL TARTRATE 25 MG: 25 TABLET, FILM COATED ORAL at 10:44

## 2024-01-23 RX ADMIN — SODIUM CHLORIDE 1000 ML: 9 INJECTION, SOLUTION INTRAVENOUS at 11:26

## 2024-01-23 RX ADMIN — ASPIRIN 81 MG 81 MG: 81 TABLET ORAL at 10:44

## 2024-01-23 RX ADMIN — SODIUM CHLORIDE, PRESERVATIVE FREE 10 ML: 5 INJECTION INTRAVENOUS at 10:45

## 2024-01-23 RX ADMIN — APIXABAN 5 MG: 5 TABLET, FILM COATED ORAL at 10:44

## 2024-01-23 NOTE — CARE COORDINATION
DISCHARGE PLANNING EVALUATION: OP/OBSERVATION        1/23/24, 8:38 AM NAIMA Vergara         Location: OBS 29/29-1   Reason for hospitalization: Atrial flutter (HCC) [I48.92]     CM Services requested for transitional needs.     PCP: Hitesh Ybarra MD    Transportation/Food Security/Housekeeping Addressed:  No issues identified.     Equipment needs: none identified     Transition plan: Obs: Home independently

## 2024-01-23 NOTE — PROGRESS NOTES
Pt's pulse continuing to jump up to 135's. Pt denies any chest pain or palpations during increased pulse rate. Pt laying in bed, resting. Nurse informed Resident @0011 about increased pulse.  0013- Resident replied: \"If his HR is sustained above 120 for more than 30 min, if his BP drops, if he develops pain/sob pls let me know. Thanks\"    Nurse monitoring pt's pulse rate; see flowsheet. Call light within reach,tele monitor in place. Will continue to monitor.

## 2024-01-23 NOTE — DISCHARGE SUMMARY
CDU Discharge Summary        Patient:  Chin Vergara  YOB: 1965    MRN: [id number]   Acct: [id number]    Primary Care Physician: Hitesh Ybarra MD    Admit date:  1/22/2024  5:17 PM  Discharge date: 1/23/2024  1:39 PM     Discharge Diagnoses:     Acute atrial flutter   Improved with fluids, medication adjustment     Follow-up:  Call today/tomorrow for a follow up appointment with Hitesh Ybarra MD , or return to the Emergency Room with worsening symptoms    Stressed to patient the importance of following up with primary care doctor for further workup/management of symptoms.  Pt verbalizes understanding and agrees with plan.    Discharge Medications:  Changes to medications - see below            Medication List        START taking these medications      apixaban 5 MG Tabs tablet  Commonly known as: ELIQUIS  Take 1 tablet by mouth 2 times daily     metoprolol tartrate 25 MG tablet  Commonly known as: LOPRESSOR  Take 1 tablet by mouth 2 times daily            CONTINUE taking these medications      COQ10 PO     glucosamine-chondroitin 500-400 MG tablet     ibuprofen 200 MG tablet  Commonly known as: ADVIL;MOTRIN     IRON PO     Magnesium 300 MG Caps     therapeutic multivitamin-minerals tablet            ASK your doctor about these medications      aspirin 81 MG chewable tablet     gabapentin 100 MG capsule  Commonly known as: NEURONTIN               Where to Get Your Medications        These medications were sent to [facility]  [address] [phone] - [facility] [phone]  [address] [place] [address]      Phone: [phone]   apixaban 5 MG Tabs tablet  metoprolol tartrate 25 MG tablet         Diet:  No diet orders on file , Advance as tolerated     Activity:  As tolerated    Consultants: IP CONSULT TO CARDIOLOGY    Procedures:  Not indicated     Diagnostic Test:   Results for orders placed or performed during the hospital encounter of 01/22/24   APTT  questions and concerns were addressed.     Time Spent: 0 day      --  Claudia Rushing MD  Emergency Medicine Resident Physician    This dictation was generated by voice recognition computer software.  Although all attempts are made to edit the dictation for accuracy, there may be errors in the transcription that are not intended.

## 2024-01-23 NOTE — ED PROVIDER NOTES
[facility]  Emergency Department Encounter  Emergency Medicine Resident     Pt Name:Chin Vergara  MRN: [id number]  Birthdate 1965  Date of evaluation: 1/22/24  PCP:  Hitesh Ybarra MD  Note Started: 7:16 PM EST      CHIEF COMPLAINT       Chief Complaint   Patient presents with    Tachycardia       HISTORY OF PRESENT ILLNESS  (Location/Symptom, Timing/Onset, Context/Setting, Quality, Duration, Modifying Factors, Severity.)      Chin Vergara is a 58 y.o. male who presents with chest pressure and shortness of breath.  Patient states that last evening he was having some beers while watching the [employer] game, states that he felt some chest pressure and shortness of breath at that time.  States that his smart watch also told him that his heart was beating fast.  This again happened today, he went to see his PCP and was found to have a fast heart rate and was told to come to the emergency department.    Initially in triage patient was noted to have a heart rate of 68 however patient now with a heart rate of 130.    Patient is denying any chest pain, states that this just happened previously and was told that he had a flutter/A-fib after receiving COVID vaccination.  He is not on anticoagulation, is not on any rate controlling medications.  He does not have a history of hypertension, does not have a history of diabetes, no history of stroke/TIA, no previous personal history or family history of CAD.    PAST MEDICAL / SURGICAL / SOCIAL / FAMILY HISTORY      has no past medical history on file.     has a past surgical history that includes Neck surgery; knee surgery; Hydrocele surgery; [place] tooth extraction; Colonoscopy; transesophageal echocardiogram (07/15/2021); and Cardioversion (07/15/2021).    Social History     Socioeconomic History    Marital status:      Spouse name: Not on file    Number of children: Not on file    Years of education: Not on file    Highest education level: Not on file

## 2024-01-23 NOTE — H&P
[facility]  CDU / OBSERVATION ENCOUNTER  Resident NOTE     Pt Name: Chin Vergara  MRN: [id number]  Birthdate 1965  Date of evaluation: 1/23/24  Patient's PCP is :  Hitesh Ybarra MD    CHIEF COMPLAINT       Chief Complaint   Patient presents with    Tachycardia         HISTORY OF PRESENT ILLNESS    Chin Vergara is a 58 y.o. male who presents with chest pressure, shortness of breath and lightheadedness.  Patient reported that he was consuming small amounts of alcohol while watching the lines game and started to feel short of breath.  His watch had informed him that his heart rate was in the 130s to 140s.  The next day, the same symptoms occurred, patient went to see his PCP and was found to be tachycardic, patient was referred to the emergency department.  Patient underwent an EKG in the emergency department showing atrial flutter with a heart rate of 130.  Patient denies any chest pain.  He does have a history of a flutter which reportedly occurred after receiving his COVID vaccination last year, patient underwent cardioversion and was discharged at that time with no recommendations or medication adjustment.  Patient was placed in the observation unit for cardiology evaluation and ongoing monitoring of his heart rate.    Location/Symptom: Chest pressure, shortness of breath, lightheadedness  Timing/Onset: Onset 1 day ago  Provocation: Occurred while consuming alcohol, but not necessarily related  Quality: N/A  Radiation: N/A  Severity: Moderate to severe  Timing/Duration: Intermittent  Modifying Factors: Rest    History was obtained in part through review of the ED chart. When possible, a direct discussion was had with ED nurses, residents, and attendings  REVIEW OF SYSTEMS       General ROS - No fevers, No malaise   Ophthalmic ROS - No discharge, No changes in vision  ENT ROS -  No sore throat, No rhinorrhea,   Respiratory ROS - shortness of breath, no cough, no   the case    SOCIAL HISTORY      reports that he has never smoked. He has never used smokeless tobacco. He reports current alcohol use.  I have reviewed and agree with all Social.  There are no concerns for substance abuse/use.    PHYSICAL EXAM     INITIAL VITALS:  height is 1.829 m (6') and weight is 93.4 kg (206 lb). His oral temperature is 97.7 °F (36.5 °C). His blood pressure is 117/89 and his pulse is 68. His respiration is 18 and oxygen saturation is 99%.      CONSTITUTIONAL: AOx4, no apparent distress, appears stated age    HEAD: normocephalic, atraumatic   EYES: EOMI    ENT: moist mucous membranes, uvula midline   NECK: supple, symmetric   BACK: symmetric   LUNGS: clear to auscultation bilaterally   CARDIOVASCULAR: regular rate and rhythm, no murmurs, rubs or gallops   ABDOMEN: soft, non-tender, non-distended with normal active bowel sounds   NEUROLOGIC:  MAEx4, no focal sensory or motor deficits   MUSCULOSKELETAL: no clubbing, cyanosis or edema   SKIN: no rash or wounds       DIFFERENTIAL DIAGNOSIS/MDM:     FROM ED MEDICAL DECISION MAKING NOTE:   58-year-old male with previous history of paroxysmal atrial flutter, not on anticoagulation, not on rate controlling medications presenting with palpitations, shortness of breath and chest tightness. Found to be in a flutter, rate is in the low 100s. Will obtain cardiac labs, basic labs, chest x-ray, ECG. Will discuss with patient however plan to admit for cardiology evaluation. Will discuss with on-call cardiologist as well once labs resulted. Low suspicion for PE at this time given lack of chest pain, lack of lower limb swelling and previous documented history of paroxysmal a flutter. Possible trigger could be patient's EtOH use yesterday evening. ACS possible, will obtain ECG and cardiac labs. Will also obtain chest x-ray to rule out infectious etiology, pneumothorax however patient is not in distress, bilateral breath sounds present, no rales or rhonchi.

## 2024-01-23 NOTE — CONSULTS
MD Rajan   Magnesium 300 MG CAPS Take 350 mg by mouth Take one tablet by mouth daily    Rajan Huddleston MD   aspirin 81 MG chewable tablet Take 1 tablet by mouth daily  Patient not taking: Reported on 1/22/2024    Rajan Huddleston MD   Multiple Vitamins-Minerals (THERAPEUTIC MULTIVITAMIN-MINERALS) tablet Take 1 tablet by mouth daily    Rajan Huddleston MD       Allergies:   Levaquin [levofloxacin in d5w]     Social History:     reports that he has never smoked. He has never used smokeless tobacco. He reports current alcohol use.     Family History:  family history is not on file.    No h/o sudden cardiac death. No for premature CAD     REVIEW OF SYSTEMS:    General: Denies any fevers, chills, rigors. No change in energy, physical activity, weight gain.   HEENT: No visual disturbances, hearing disturbances, nasal drainage or neck swelling  Heart: Shortness of breath   lungs: Denies SOB, SPENCER,  cough, wheezing or pleurisy  Abdomen: Denies abdominal pain, nausea, vomiting, constipation, diarrhea or rectal  bleeding   Extremities: Denies any leg swelling or calf tenderness  Musculo skeletal: Denies any arthralgias, joint swelling  Skin: Denies any rash/skin changes  Hem/Onc: Denies bleeding gums, swollen lymph nodes  Endo: Denies polydypsia, polyphagia  Psychiatric: Denies any mood changes, agitation or psychosis    PHYSICAL EXAM:    Physical Examination:    Temperature:  Temp: 97.7 °F (36.5 °C)  Respirations:  Respirations: 18  Pulse:   Pulse: 68  BP:    BP: 117/89    Constitutional and General Appearance: alert, cooperative, no distress and appears stated age  HEENT: PERRL, no cervical lymphadenopathy. No masses palpable. Normal oral mucosa  Respiratory:  Normal excursion and expansion without use of accessory muscles  Resp Auscultation: Good respiratory effort. No for increased work of breathing. CTAB  Cardiovascular:  The apical impulse is not displaced  Heart auscultation: regular rate &  conscious sedation.  Normal left ventricular chamber dimension and function.  Estimated LV EF of 55%.  Left atrial appendage showed no evidence of clot.  Inter-atrial septum is intact with no evidence for an atrial septal defect.  Negative bubble study, no shunt noted.  No significant valvular regurgitation or stenosis seen.  No obvious thrombus, vegetation or shunt on present study.  No significant pericardial effusion is seen    Last Stress test/ LHC:   No left heart cath/stress test on file    IMPRESSION:    Principal Problem:    Atrial flutter (HCC)  Resolved Problems:    * No resolved hospital problems. *      A flutter with variable AV block  History of prior a flutter in the past status post DIANNA and cardioversion  Preserved EF of 55% on last echo in 2021  Home medication reviewed-patient was not on any beta-blocker or anticoagulation      RECOMMENDATIONS:  Labs, ECG, prior notes, telemetry,  MAR reviewed   Start the patient on Lopressor 25 twice daily  Start the patient on Eliquis 5 twice daily for anticoagulation for a flutter  Keep potassium more than 4 and magnesium more than 2    Final plan and recommendation will follow discussion with attending Cardiologist Dr. Charlton.     Maggi Miles MD  Internal Medicine Resident, PGY-2  [facility]

## 2024-01-23 NOTE — PROGRESS NOTES
[facility]  CDU / OBSERVATION ENCOUNTER  ATTENDING NOTE       I performed a history and physical examination of the patient and discussed management with the resident or midlevel provider. I reviewed the resident or midlevel provider's note and agree with the documented findings and plan of care. Any areas of disagreement are noted on the chart. I was personally present for the key portions of any procedures. I have documented in the chart those procedures where I was not present during the key portions. I have reviewed the nurses notes. I agree with the chief complaint, past medical history, past surgical history, allergies, medications, social and family history as documented unless otherwise noted below.    The Family history, social history, and ROS are effectively unchanged since admission unless noted elsewhere in the chart.    This patient was placed in the observation unit for reevaluation for possible admission to the hospital    Patient asymptomatic now.  Patient in atrial flutter.  History of A-fib.  Patient seen by cardiology.  Patient had arrhythmia began after greater than usual caffeine and alcohol intake.  Patient had evaluation by attending cardiologist and was felt to require ongoing Holter monitoring and beta-blockade.  Patient was kept for hydration and reevaluation.  Patient was doing well and was subsequently discharged in good condition for reassessment as outpatient.    Stefan Biggs MD  Attending Emergency  Physician

## 2024-01-24 NOTE — PROGRESS NOTES
Patient phoned questioning his Cardiology consult.  States he was told that he could have an ablation.  Dr. Charlton saw patient and stated he should call [phone] Dr. Durga Rasmussen for appt.  Patient updated on plan.

## 2024-01-26 LAB
EKG ATRIAL RATE: 278 BPM
EKG P AXIS: -95 DEGREES
EKG Q-T INTERVAL: 334 MS
EKG QRS DURATION: 104 MS
EKG QTC CALCULATION (BAZETT): 433 MS
EKG R AXIS: 32 DEGREES
EKG T AXIS: 10 DEGREES
EKG VENTRICULAR RATE: 101 BPM

## 2024-01-26 PROCEDURE — 93010 ELECTROCARDIOGRAM REPORT: CPT | Performed by: INTERNAL MEDICINE

## 2024-01-31 ENCOUNTER — INITIAL CONSULT (OUTPATIENT)
Age: 59
End: 2024-01-31
Payer: COMMERCIAL

## 2024-01-31 VITALS
TEMPERATURE: 97 F | SYSTOLIC BLOOD PRESSURE: 122 MMHG | OXYGEN SATURATION: 98 % | RESPIRATION RATE: 18 BRPM | HEIGHT: 72 IN | WEIGHT: 201.4 LBS | HEART RATE: 90 BPM | DIASTOLIC BLOOD PRESSURE: 75 MMHG | BODY MASS INDEX: 27.28 KG/M2

## 2024-01-31 DIAGNOSIS — I48.3 TYPICAL ATRIAL FLUTTER (HCC): Primary | ICD-10-CM

## 2024-01-31 PROCEDURE — 99205 OFFICE O/P NEW HI 60 MIN: CPT | Performed by: SPECIALIST

## 2024-01-31 RX ORDER — METOPROLOL TARTRATE 50 MG/1
50 TABLET, FILM COATED ORAL 2 TIMES DAILY
Qty: 60 TABLET | Refills: 3 | Status: SHIPPED | OUTPATIENT
Start: 2024-01-31

## 2024-01-31 ASSESSMENT — ENCOUNTER SYMPTOMS
ALLERGIC/IMMUNOLOGIC NEGATIVE: 1
EYES NEGATIVE: 1
GASTROINTESTINAL NEGATIVE: 1
SHORTNESS OF BREATH: 1

## 2024-01-31 NOTE — PROGRESS NOTES
[facility] CARDIAC ELECTROPHYSIOLOGY  [address], [address]  [facility]  [address]    Date of Visit:  2024  Patient Name: Chin Vergara   Patient :  1965   Referring By:  No ref. provider found     CHIEF COMPLAINT/HPI:     Chin Vergara is a 58 y.o. male who presents today for an general visit to be evaluated for the following condition(s):  Chief Complaint   Patient presents with    Consultation     A-flutter   58 years old gentleman was seen recently in the emergency room with atrial flutter.  Because of symptomatic palpitation with increased shortness of breath he was seen by his family doctor who found him to be in atrial flutter with fast ventricular response and he was sent to the emergency room.  He was a started on oral anticoagulants with Eliquis 5 mg p.o. twice daily and on Lopressor 25 mg p.o. twice daily.  Workup was ordered.  Holter was ordered and not available yet.  No echo of recent history is available.    His symptoms probably goes back to earlier in in the last year.  It just culminated recently.    In 2021 he was diagnosed to have atrial flutter.  He blames it on COVID-vaccine.  He ended up having DIANNA and cardioverted.  He said he was only briefly on oral anticoagulation for a week or 2 from what he said and he stopped taking it.  He gives no symptoms of thromboembolic event.    No family history of similar condition.    No known history of valvular disease.  He is echo of  reported preserved left ventricular systolic function and no valvular disease.    He admits to more than average increased alcohol intake.  He comes from a big family of 5 brothers and 5 sisters and there is no reported history of atrial flutter or fibrillation.    He is non-smoker.  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  He was diagnosed in the past of obstructive sleep apnea although it was not being treated.    No known history of diabetes.  No history of thromboembolic

## 2024-02-04 LAB — ECHO BSA: 2.18 M2

## 2024-02-08 ENCOUNTER — HOSPITAL ENCOUNTER (OUTPATIENT)
Age: 59
Discharge: HOME | End: 2024-02-10
Attending: SPECIALIST
Payer: COMMERCIAL

## 2024-02-08 DIAGNOSIS — I48.3 TYPICAL ATRIAL FLUTTER (HCC): ICD-10-CM

## 2024-02-08 PROCEDURE — 93306 TTE W/DOPPLER COMPLETE: CPT

## 2024-02-09 LAB
ECHO AO ROOT DIAM: 3.9 CM
ECHO AV PEAK GRADIENT: 3 MMHG
ECHO AV PEAK VELOCITY: 0.8 M/S
ECHO LA AREA 4C: 26.6 CM2
ECHO LA DIAMETER: 4.5 CM
ECHO LA MAJOR AXIS: 7 CM
ECHO LA TO AORTIC ROOT RATIO: 1.15
ECHO LA VOL MOD A4C: 80 ML (ref 18–58)
ECHO LV E' LATERAL VELOCITY: 9 CM/S
ECHO LV E' SEPTAL VELOCITY: 9 CM/S
ECHO LV EDV A2C: 122 ML
ECHO LV EDV A4C: 146 ML
ECHO LV EJECTION FRACTION A2C: 40 %
ECHO LV EJECTION FRACTION A4C: 51 %
ECHO LV EJECTION FRACTION BIPLANE: 45 % (ref 55–100)
ECHO LV ESV A2C: 74 ML
ECHO LV ESV A4C: 72 ML
ECHO LV FRACTIONAL SHORTENING: 18 % (ref 28–44)
ECHO LV INTERNAL DIMENSION DIASTOLIC: 5.1 CM (ref 4.2–5.9)
ECHO LV INTERNAL DIMENSION SYSTOLIC: 4.2 CM
ECHO LV IVSD: 1.2 CM (ref 0.6–1)
ECHO LV MASS 2D: 241.2 G (ref 88–224)
ECHO LV POSTERIOR WALL DIASTOLIC: 1.2 CM (ref 0.6–1)
ECHO LV RELATIVE WALL THICKNESS RATIO: 0.47
ECHO MV A VELOCITY: 0.3 M/S
ECHO MV E DECELERATION TIME (DT): 109 MS
ECHO MV E VELOCITY: 0.44 M/S
ECHO MV E/A RATIO: 1.47
ECHO MV E/E' LATERAL: 4.89
ECHO MV E/E' RATIO (AVERAGED): 4.89
ECHO TV REGURGITANT MAX VELOCITY: 1.96 M/S
ECHO TV REGURGITANT PEAK GRADIENT: 15 MMHG

## 2024-02-14 ENCOUNTER — OFFICE VISIT (OUTPATIENT)
Age: 59
End: 2024-02-14
Payer: COMMERCIAL

## 2024-02-14 VITALS
WEIGHT: 203 LBS | TEMPERATURE: 97 F | DIASTOLIC BLOOD PRESSURE: 76 MMHG | SYSTOLIC BLOOD PRESSURE: 130 MMHG | BODY MASS INDEX: 27.5 KG/M2 | HEIGHT: 72 IN | RESPIRATION RATE: 18 BRPM | OXYGEN SATURATION: 98 % | HEART RATE: 82 BPM

## 2024-02-14 DIAGNOSIS — Z01.818 PRE-OP TESTING: Primary | ICD-10-CM

## 2024-02-14 DIAGNOSIS — I48.3 TYPICAL ATRIAL FLUTTER (HCC): Primary | ICD-10-CM

## 2024-02-14 DIAGNOSIS — I48.92 ATRIAL FLUTTER, UNSPECIFIED TYPE (HCC): ICD-10-CM

## 2024-02-14 DIAGNOSIS — I50.20 SYSTOLIC CONGESTIVE HEART FAILURE, UNSPECIFIED HF CHRONICITY (HCC): ICD-10-CM

## 2024-02-14 PROCEDURE — 99214 OFFICE O/P EST MOD 30 MIN: CPT | Performed by: SPECIALIST

## 2024-02-14 RX ORDER — METOPROLOL TARTRATE 50 MG/1
50 TABLET, FILM COATED ORAL EVERY 8 HOURS
Qty: 60 TABLET | Refills: 3 | Status: SHIPPED | OUTPATIENT
Start: 2024-02-14 | End: 2024-02-14

## 2024-02-14 RX ORDER — METOPROLOL TARTRATE 50 MG/1
50 TABLET, FILM COATED ORAL EVERY 8 HOURS
Qty: 60 TABLET | Refills: 3 | Status: SHIPPED | OUTPATIENT
Start: 2024-02-14

## 2024-02-14 NOTE — PROGRESS NOTES
[facility] CARDIAC ELECTROPHYSIOLOGY  [address], [address]  [place]  [address]    Date of Visit:  2024  Patient Name: Chin Vergara   Patient :  1965   Referring By:  No ref. provider found     CHIEF COMPLAINT/HPI:     Chin Vergara is a 58 y.o. male who presents today for an general visit to be evaluated for the following condition(s):  Chief Complaint   Patient presents with    Follow-up     2 week f/u post holter monitor testing   Holter showed his in flutter all the time with moderate to fast ventricular rate despite Lopressor of 50 mg p.o. twice daily.  He feels better since we increased the dose to twice daily 50 mg.  The echo describes ejection fraction to be 35 to 40%.  No significant structural heart disease.  Atria are mildly dilated.  The patient in general feels better since we increased his beta-blocker.  He has been on Eliquis since  and he has been faithful in taking his medications.  Has no issues and taken his oral anticoagulation.    We discussed in the past that we need to get him out of the atrial flutter soon and at 1 point in the future discussed typical atrial flutter ablation.  He understands and he agrees.    He continues to drink alcohol more than average.    Lengthy discussion about the DC cardioversion was done with the patient.  He understands and he agrees.    Will going to increase his Lopressor to 50 mg p.o. Q8.    Will provide him with reading material about the DC cardioversion.    REVIEW OF SYSTEM      Review of Systems noncontributory.    REVIEWED INFORMATION      Allergies   Allergen Reactions    Levaquin [Levofloxacin In D5w] Hives       Current Outpatient Medications   Medication Sig Dispense Refill    metoprolol tartrate (LOPRESSOR) 50 MG tablet Take 1 tablet by mouth in the morning and 1 tablet at noon and 1 tablet in the evening. 60 tablet 3    apixaban (ELIQUIS) 5 MG TABS tablet Take 1 tablet by mouth 2 times daily 60 tablet 3    ibuprofen

## 2024-02-19 ENCOUNTER — HOSPITAL ENCOUNTER (OUTPATIENT)
Age: 59
Setting detail: SPECIMEN
Discharge: HOME | End: 2024-02-19

## 2024-02-19 DIAGNOSIS — I48.92 ATRIAL FLUTTER, UNSPECIFIED TYPE (HCC): ICD-10-CM

## 2024-02-19 DIAGNOSIS — Z01.818 PRE-OP TESTING: ICD-10-CM

## 2024-02-19 LAB
ERYTHROCYTE [DISTWIDTH] IN BLOOD BY AUTOMATED COUNT: 13.5 % (ref 11.8–14.4)
HCT VFR BLD AUTO: 46.2 % (ref 40.7–50.3)
HGB BLD-MCNC: 14.7 G/DL (ref 13–17)
MCH RBC QN AUTO: 31.7 PG (ref 25.2–33.5)
MCHC RBC AUTO-ENTMCNC: 31.8 G/DL (ref 28.4–34.8)
MCV RBC AUTO: 99.6 FL (ref 82.6–102.9)
NRBC BLD-RTO: 0 PER 100 WBC
PLATELET # BLD AUTO: 259 K/UL (ref 138–453)
PMV BLD AUTO: 11.8 FL (ref 8.1–13.5)
RBC # BLD AUTO: 4.64 M/UL (ref 4.21–5.77)
WBC OTHER # BLD: 6.1 K/UL (ref 3.5–11.3)

## 2024-02-23 ENCOUNTER — HOSPITAL ENCOUNTER (OUTPATIENT)
Age: 59
Discharge: HOME | End: 2024-02-23
Attending: SPECIALIST
Payer: COMMERCIAL

## 2024-02-23 ENCOUNTER — OFFICE VISIT (OUTPATIENT)
Dept: OPERATING ROOM | Age: 59
End: 2024-02-23
Attending: SPECIALIST

## 2024-02-23 VITALS
TEMPERATURE: 97.9 F | RESPIRATION RATE: 15 BRPM | DIASTOLIC BLOOD PRESSURE: 73 MMHG | WEIGHT: 204 LBS | HEIGHT: 72 IN | HEART RATE: 60 BPM | SYSTOLIC BLOOD PRESSURE: 110 MMHG | BODY MASS INDEX: 27.63 KG/M2 | OXYGEN SATURATION: 100 %

## 2024-02-23 DIAGNOSIS — I48.92 ATRIAL FLUTTER (HCC): Primary | ICD-10-CM

## 2024-02-23 DIAGNOSIS — I48.92 ATRIAL FLUTTER, UNSPECIFIED TYPE (HCC): ICD-10-CM

## 2024-02-23 LAB
BUN BLD-MCNC: 17 MG/DL (ref 8–26)
CHLORIDE BLD-SCNC: 110 MMOL/L (ref 98–107)
ECHO BSA: 2.17 M2
ECHO BSA: 2.17 M2
EGFR, POC: >60 ML/MIN/1.73M2
EKG ATRIAL RATE: 267 BPM
EKG ATRIAL RATE: 267 BPM
EKG ATRIAL RATE: 56 BPM
EKG P AXIS: -84 DEGREES
EKG P AXIS: -84 DEGREES
EKG P AXIS: 57 DEGREES
EKG P-R INTERVAL: 190 MS
EKG Q-T INTERVAL: 376 MS
EKG Q-T INTERVAL: 376 MS
EKG Q-T INTERVAL: 432 MS
EKG QRS DURATION: 104 MS
EKG QTC CALCULATION (BAZETT): 416 MS
EKG QTC CALCULATION (BAZETT): 457 MS
EKG QTC CALCULATION (BAZETT): 457 MS
EKG R AXIS: 37 DEGREES
EKG R AXIS: 9 DEGREES
EKG R AXIS: 9 DEGREES
EKG T AXIS: -11 DEGREES
EKG T AXIS: -11 DEGREES
EKG T AXIS: 22 DEGREES
EKG VENTRICULAR RATE: 56 BPM
EKG VENTRICULAR RATE: 89 BPM
EKG VENTRICULAR RATE: 89 BPM
GLUCOSE BLD-MCNC: 79 MG/DL (ref 74–100)
HCT VFR BLD AUTO: 45 % (ref 41–53)
POC CREATININE: 1 MG/DL (ref 0.51–1.19)
POC HEMOGLOBIN (CALC): 15.4 G/DL (ref 13.5–17.5)
POTASSIUM BLD-SCNC: 4.3 MMOL/L (ref 3.5–4.5)
POTASSIUM BLD-SCNC: 5.6 MMOL/L (ref 3.5–4.5)
SODIUM BLD-SCNC: 145 MMOL/L (ref 138–146)

## 2024-02-23 PROCEDURE — 92960 CARDIOVERSION ELECTRIC EXT: CPT | Performed by: SPECIALIST

## 2024-02-23 PROCEDURE — 92960 CARDIOVERSION ELECTRIC EXT: CPT

## 2024-02-23 PROCEDURE — 82435 ASSAY OF BLOOD CHLORIDE: CPT

## 2024-02-23 PROCEDURE — 84295 ASSAY OF SERUM SODIUM: CPT

## 2024-02-23 PROCEDURE — 2580000003 HC RX 258: Performed by: SPECIALIST

## 2024-02-23 PROCEDURE — 93005 ELECTROCARDIOGRAM TRACING: CPT | Performed by: SPECIALIST

## 2024-02-23 PROCEDURE — 6360000002 HC RX W HCPCS: Performed by: SPECIALIST

## 2024-02-23 PROCEDURE — 82947 ASSAY GLUCOSE BLOOD QUANT: CPT

## 2024-02-23 PROCEDURE — 84132 ASSAY OF SERUM POTASSIUM: CPT

## 2024-02-23 PROCEDURE — 85014 HEMATOCRIT: CPT

## 2024-02-23 PROCEDURE — 84520 ASSAY OF UREA NITROGEN: CPT

## 2024-02-23 PROCEDURE — 82565 ASSAY OF CREATININE: CPT

## 2024-02-23 PROCEDURE — 7100000011 HC PHASE II RECOVERY - ADDTL 15 MIN: Performed by: SPECIALIST

## 2024-02-23 PROCEDURE — 93010 ELECTROCARDIOGRAM REPORT: CPT | Performed by: INTERNAL MEDICINE

## 2024-02-23 PROCEDURE — 99238 HOSP IP/OBS DSCHRG MGMT 30/<: CPT | Performed by: SPECIALIST

## 2024-02-23 PROCEDURE — 93005 ELECTROCARDIOGRAM TRACING: CPT

## 2024-02-23 PROCEDURE — 7100000010 HC PHASE II RECOVERY - FIRST 15 MIN: Performed by: SPECIALIST

## 2024-02-23 RX ORDER — SODIUM CHLORIDE 9 MG/ML
INJECTION, SOLUTION INTRAVENOUS CONTINUOUS
Status: DISCONTINUED | OUTPATIENT
Start: 2024-02-23 | End: 2024-02-26 | Stop reason: HOSPADM

## 2024-02-23 RX ORDER — METOPROLOL TARTRATE 50 MG/1
50 TABLET, FILM COATED ORAL 2 TIMES DAILY
COMMUNITY

## 2024-02-23 RX ADMIN — SODIUM CHLORIDE: 9 INJECTION, SOLUTION INTRAVENOUS at 08:53

## 2024-02-23 RX ADMIN — PROPOFOL 60 MG: 10 INJECTION, EMULSION INTRAVENOUS at 09:23

## 2024-02-23 NOTE — PROGRESS NOTES
Patient admitted, questions answered.Consent done in office. Patient ready for procedure. Call light to reach with side rails up 2 of 2.  Family at bedside with patient.  History and physical complete.

## 2024-02-23 NOTE — PROGRESS NOTES
TRANSFER - OUT REPORT:    Verbal report given to grisel (name) on Chin Vergara being transferred to [facility] (unit) for routine post-op       Report consisted of patient's Situation, Background, Assessment and   Recommendations(SBAR).     Information from the following report(s) Nurse Handoff Report was reviewed with the receiving nurse.    Opportunity for questions and clarification was provided.      Patient transported with:   O2 @ 2 liters

## 2024-02-23 NOTE — H&P
Chin Vergara is a 58 y.o. male who presents today for an general visit to be evaluated for the following condition(s):       Chief Complaint   Patient presents with    Follow-up       2 week f/u post holter monitor testing   Holter showed his in flutter all the time with moderate to fast ventricular rate despite Lopressor of 50 mg p.o. twice daily.  He feels better since we increased the dose to twice daily 50 mg.  The echo describes ejection fraction to be 35 to 40%.  No significant structural heart disease.  Atria are mildly dilated.  The patient in general feels better since we increased his beta-blocker.  He has been on Eliquis since January 23 and he has been faithful in taking his medications.  Has no issues and taken his oral anticoagulation.     We discussed in the past that we need to get him out of the atrial flutter soon and at 1 point in the future discussed typical atrial flutter ablation.  He understands and he agrees.     He continues to drink alcohol more than average.     Lengthy discussion about the DC cardioversion was done with the patient.  He understands and he agrees.     Will going to increase his Lopressor to 50 mg p.o. Q8.     Will provide him with reading material about the DC cardioversion.     REVIEW OF SYSTEM                                                                       Review of Systems noncontributory.     REVIEWED INFORMATION                                                                            Allergies   Allergen Reactions    Levaquin [Levofloxacin In D5w] Hives         Current Facility-Administered Medications          Current Outpatient Medications   Medication Sig Dispense Refill    metoprolol tartrate (LOPRESSOR) 50 MG tablet Take 1 tablet by mouth in the morning and 1 tablet at noon and 1 tablet in the evening. 60 tablet 3    apixaban (ELIQUIS) 5 MG TABS tablet Take 1 tablet by mouth 2 times daily 60 tablet 3    ibuprofen (ADVIL;MOTRIN) 200 MG tablet Take 1  true      Ran Out of Food in the Last Year: Never true   Transportation Needs: Unknown (1/22/2024)     PRAPARE - Transportation      Lack of Transportation (Non-Medical): No   Housing Stability: Unknown (1/22/2024)     Housing Stability Vital Sign      Unstable Housing in the Last Year: No            PHYSICAL EXAM      /76 (Site: Left Upper Arm, Position: Sitting, Cuff Size: Medium Adult)   Pulse 82   Temp 97 °F (36.1 °C) (Temporal)   Resp 18   Ht 1.829 m (6')   Wt 92.1 kg (203 lb)   SpO2 98%   BMI 27.53 kg/m²    Physical Exam alert oriented cooperative nice gentleman in no distress.  Heart rate is fast like 100 bpm.  JVP is not elevated decreased air entry bilaterally could not appreciate gallop rub or murmur.  No hepatosplenomegaly no pedal edema.           ASSESSMENT/PLAN      Chin was seen today for follow-up.     Diagnoses and all orders for this visit:     Typical atrial flutter (HCC)     Systolic congestive heart failure, unspecified HF chronicity (HCC)     Other orders  -     metoprolol tartrate (LOPRESSOR) 50 MG tablet; Take 1 tablet by mouth in the morning and 1 tablet at noon and 1 tablet in the evening.     Plans for increasing Lopressor to 50 mg p.o. Q8, DC cardioversion, and proceed from there.

## 2024-02-23 NOTE — PROGRESS NOTES
Received post Cardioversion procedure to PCC room 2. Assessment obtained. Restrictions reviewed with patient. Post procedure pathway initiated. Family at side.  Patient without complaints. EKG obtained pt. Remains SB.  Water and boxed lunch provided.

## 2024-02-23 NOTE — DISCHARGE SUMMARY
Discharge Summary    Date: 2/23/2024  Patient Name: Chin Vergara    YOB: 1965     Age: 58 y.o.    Admit Date: 2/23/2024  Discharge Date: 2/23/2024  Discharge Condition: Good    Admission Diagnosis  Atrial flutter, unspecified type (HCC) [I48.92]      Discharge Diagnosis  Active Problems:    * No active hospital problems. *  Resolved Problems:    * No resolved hospital problems. *      Hospital Stay  Narrative of Hospital Course:  Dc cv    Consultants:  None    Surgeries/procedures Performed:      Treatments:            Discharge Plan/Disposition:  Home    Hospital/Incidental Findings Requiring Follow Up:    Patient Instructions:    Diet:    Activity:Activiity as Tolerated, No Driving for Today  For number of days (if applicable):      Other Instructions:    Provider Follow-Up:   No follow-ups on file.     Significant Diagnostic Studies:    Recent Labs:  Hospital Outpatient Visit on 02/23/2024  Body Surface Area                             Date: 02/23/2024  Value: 2.17        Ref range: m2                 Status: In process  Ventricular Rate                              Date: 02/23/2024  Value: 89          Ref range: BPM                Status: Preliminary  Atrial Rate                                   Date: 02/23/2024  Value: 267         Ref range: BPM                Status: Preliminary  QRS Duration                                  Date: 02/23/2024  Value: 104         Ref range: ms                 Status: Preliminary  Q-T Interval                                  Date: 02/23/2024  Value: 376         Ref range: ms                 Status: Preliminary  QTc Calculation (Bazett)                      Date: 02/23/2024  Value: 457         Ref range: ms                 Status: Preliminary  P Axis                                        Date: 02/23/2024  Value: -84         Ref range: degrees            Status: Preliminary  R Axis                                        Date: 02/23/2024  Value: 9           Ref  range: degrees            Status: Preliminary  T Axis                                        Date: 02/23/2024  Value: -11         Ref range: degrees            Status: Preliminary  POC Hemoglobin (calc)                         Date: 02/23/2024  Value: 15.4        Ref range: 13.5 - 17.5 g/dL   Status: Final  POC Hematocrit                                Date: 02/23/2024  Value: 45          Ref range: 41 - 53 %          Status: Final  POC Creatinine                                Date: 02/23/2024  Value: 1.0         Ref range: 0.51 - 1.19 mg/dL  Status: Final  eGFR, POC                                     Date: 02/23/2024  Value: >60         Ref range: mL/min/1.73m2      Status: Final                Comment:       These results are not intended for use in patients <18 years of age.        eGFR results are calculated without a race factor using the 2021 CKD-EPI equation.  Careful clinical correlation is recommended, particularly when comparing to results   calculated using previous equations.  The CKD-EPI equation is less accurate in patients with extremes of muscle mass, extra-renal   metabolism of creatine, excessive creatine ingestion, or following therapy that affects   renal tubular secretion.    POC Sodium                                    Date: 02/23/2024  Value: 145         Ref range: 138 - 146 mmol/L   Status: Final  POC Potassium                                 Date: 02/23/2024  Value: 5.6 (H)     Ref range: 3.5 - 4.5 mmol/L   Status: Final  POC Chloride                                  Date: 02/23/2024  Value: 110 (H)     Ref range: 98 - 107 mmol/L    Status: Final  POC BUN                                       Date: 02/23/2024  Value: 17          Ref range: 8 - 26 mg/dL       Status: Final  POC Glucose                                   Date: 02/23/2024  Value: 79          Ref range: 74 - 100 mg/dL     Status: Final  POC Potassium                                 Date: 02/23/2024  Value: 4.3         Ref

## 2024-02-23 NOTE — PROGRESS NOTES
All discharge instructions reviewed, questions answered, paper signed and given copy. Patient discharged per ambulatory with wife and belongings.   Patient Reported Weight(Optional But Include Units): 275.0 Detail Level: Zone Comments: Restart from loading dose strength forward.

## 2024-02-28 ENCOUNTER — TELEPHONE (OUTPATIENT)
Dept: VASCULAR SURGERY | Age: 59
End: 2024-02-28

## 2024-02-29 ENCOUNTER — TELEPHONE (OUTPATIENT)
Dept: CARDIOTHORACIC SURGERY | Age: 59
End: 2024-02-29

## 2024-02-29 NOTE — ED NOTES
Pt came into the ed via triage due to having SOB and tachycardia.   Pt stated after he was vaccinated he was diagnosed with Afib  Pt is aox4 and ambulatory.   Pt denies any pain  Pt stated he saw his HR on the fitbit was in the 140's and PCP sent him over.   RR are equal and regular.       
Yes

## 2024-02-29 NOTE — TELEPHONE ENCOUNTER
Patient called in stating that he has stopped taking his metoprolol due to it making his heart rate drop under 60 and I'm feeling sick when that happens. He also states he has cold feet when he takes the medication. I did advise him that the metoprolol is used to help lower the heart rate, but he states he does not like it to be that low.

## 2024-03-12 ENCOUNTER — HOSPITAL ENCOUNTER (OUTPATIENT)
Age: 59
Setting detail: OUTPATIENT SURGERY
Discharge: HOME | End: 2024-03-12
Attending: SPECIALIST | Admitting: SPECIALIST
Payer: COMMERCIAL

## 2024-03-12 VITALS
OXYGEN SATURATION: 97 % | HEART RATE: 78 BPM | DIASTOLIC BLOOD PRESSURE: 60 MMHG | RESPIRATION RATE: 12 BRPM | SYSTOLIC BLOOD PRESSURE: 120 MMHG

## 2024-03-12 DIAGNOSIS — I48.92 ATRIAL FLUTTER, UNSPECIFIED TYPE (HCC): ICD-10-CM

## 2024-03-12 LAB
ALBUMIN SERPL-MCNC: 4.9 G/DL (ref 3.5–5.2)
ALBUMIN/GLOB SERPL: 2 {RATIO} (ref 1–2.5)
ALP SERPL-CCNC: 54 U/L (ref 40–129)
ALT SERPL-CCNC: 23 U/L (ref 10–50)
ANION GAP SERPL CALCULATED.3IONS-SCNC: 12 MMOL/L (ref 9–16)
AST SERPL-CCNC: 20 U/L (ref 10–50)
BILIRUB SERPL-MCNC: 0.6 MG/DL (ref 0–1.2)
BUN SERPL-MCNC: 15 MG/DL (ref 8–23)
CALCIUM SERPL-MCNC: 9.6 MG/DL (ref 8.6–10.4)
CHLORIDE SERPL-SCNC: 108 MMOL/L (ref 98–107)
CO2 SERPL-SCNC: 23 MMOL/L (ref 20–31)
CREAT SERPL-MCNC: 0.9 MG/DL (ref 0.7–1.2)
GFR SERPL CREATININE-BSD FRML MDRD: >60 ML/MIN/1.73M2
GLUCOSE SERPL-MCNC: 114 MG/DL (ref 74–99)
POTASSIUM SERPL-SCNC: 4.4 MMOL/L (ref 3.7–5.3)
PROT SERPL-MCNC: 7.4 G/DL (ref 6.6–8.7)
SODIUM SERPL-SCNC: 143 MMOL/L (ref 136–145)

## 2024-03-12 PROCEDURE — 7100000011 HC PHASE II RECOVERY - ADDTL 15 MIN: Performed by: SPECIALIST

## 2024-03-12 PROCEDURE — 99152 MOD SED SAME PHYS/QHP 5/>YRS: CPT | Performed by: SPECIALIST

## 2024-03-12 PROCEDURE — C1732 CATH, EP, DIAG/ABL, 3D/VECT: HCPCS | Performed by: SPECIALIST

## 2024-03-12 PROCEDURE — 2709999900 HC NON-CHARGEABLE SUPPLY: Performed by: SPECIALIST

## 2024-03-12 PROCEDURE — C1731 CATH, EP, 20 OR MORE ELEC: HCPCS | Performed by: SPECIALIST

## 2024-03-12 PROCEDURE — 93662 INTRACARDIAC ECG (ICE): CPT | Performed by: SPECIALIST

## 2024-03-12 PROCEDURE — C1759 CATH, INTRA ECHOCARDIOGRAPHY: HCPCS | Performed by: SPECIALIST

## 2024-03-12 PROCEDURE — 99221 1ST HOSP IP/OBS SF/LOW 40: CPT | Performed by: SPECIALIST

## 2024-03-12 PROCEDURE — 2720000010 HC SURG SUPPLY STERILE: Performed by: SPECIALIST

## 2024-03-12 PROCEDURE — 7100000010 HC PHASE II RECOVERY - FIRST 15 MIN: Performed by: SPECIALIST

## 2024-03-12 PROCEDURE — 93653 COMPRE EP EVAL TX SVT: CPT | Performed by: SPECIALIST

## 2024-03-12 PROCEDURE — C1894 INTRO/SHEATH, NON-LASER: HCPCS | Performed by: SPECIALIST

## 2024-03-12 PROCEDURE — 80053 COMPREHEN METABOLIC PANEL: CPT

## 2024-03-12 PROCEDURE — 6360000002 HC RX W HCPCS: Performed by: SPECIALIST

## 2024-03-12 PROCEDURE — 99153 MOD SED SAME PHYS/QHP EA: CPT | Performed by: SPECIALIST

## 2024-03-12 PROCEDURE — 93613 INTRACARDIAC EPHYS 3D MAPG: CPT | Performed by: SPECIALIST

## 2024-03-12 PROCEDURE — 2500000003 HC RX 250 WO HCPCS: Performed by: SPECIALIST

## 2024-03-12 RX ORDER — ACETAMINOPHEN 325 MG/1
650 TABLET ORAL EVERY 4 HOURS PRN
Status: DISCONTINUED | OUTPATIENT
Start: 2024-03-12 | End: 2024-03-12 | Stop reason: HOSPADM

## 2024-03-12 RX ORDER — FENTANYL CITRATE 50 UG/ML
INJECTION, SOLUTION INTRAMUSCULAR; INTRAVENOUS PRN
Status: DISCONTINUED | OUTPATIENT
Start: 2024-03-12 | End: 2024-03-12 | Stop reason: HOSPADM

## 2024-03-12 RX ORDER — MIDAZOLAM HYDROCHLORIDE 1 MG/ML
INJECTION INTRAMUSCULAR; INTRAVENOUS PRN
Status: DISCONTINUED | OUTPATIENT
Start: 2024-03-12 | End: 2024-03-12 | Stop reason: HOSPADM

## 2024-03-12 RX ORDER — SODIUM CHLORIDE 9 MG/ML
INJECTION, SOLUTION INTRAVENOUS CONTINUOUS
Status: DISCONTINUED | OUTPATIENT
Start: 2024-03-12 | End: 2024-03-12 | Stop reason: HOSPADM

## 2024-03-12 NOTE — PROGRESS NOTES
All discharge instructions reviewed, questions answered. Patient to be discharged with all belongings. Pulses obtained & unchanged. Site clean dry and intact. No bleeding, hematoma, or bruising noted. Patient to be ambulatory atdischarge. Patient ambulatory and discharged at 1930

## 2024-03-12 NOTE — PROGRESS NOTES
Patient admitted, consent signed and questions answered. Patient ready for procedure. Call light to reach with side rails up 2 of 2. B/L Groin clipped with writer and Ada COUGHLIN present.  Family at bedside with patient.  History and physical complete.

## 2024-03-12 NOTE — DISCHARGE INSTRUCTIONS
instructions:  \" Have someone responsible help you with your care.  \" Do not drive for 24 hours.  \" Do not operate equipment for 24 hours (lawnmowers, power tools, kitchen accessories, stove).  \" Do not drink any alcoholic beverages for a minimum of 24 hours.  \" Do not make important personal, legal or business decisions for 24 hours.  \" You may experience dizziness or lightheadedness. Move slowly and carefully, do not make sudden position changes.  \" Drink extra amounts of fluids today.  \" Increase your diet as tolerated (unless you have received specific instructions from your doctor).  \" If you feel nauseated, continue with liquids until the nausea is gone.  \" Notify your physician if you have not urinated within 8 hours after the procedure.  \" Resume your medications unless otherwise instructed.

## 2024-03-12 NOTE — H&P
the Last Year: Never true      Ran Out of Food in the Last Year: Never true   Transportation Needs: Unknown (1/22/2024)     PRAPARE - Transportation      Lack of Transportation (Non-Medical): No   Housing Stability: Unknown (1/22/2024)     Housing Stability Vital Sign      Unstable Housing in the Last Year: No            PHYSICAL EXAM      /76 (Site: Left Upper Arm, Position: Sitting, Cuff Size: Medium Adult)   Pulse 82   Temp 97 °F (36.1 °C) (Temporal)   Resp 18   Ht 1.829 m (6')   Wt 92.1 kg (203 lb)   SpO2 98%   BMI 27.53 kg/m²    Physical Exam alert oriented cooperative nice gentleman in no distress.  Heart rate is fast like 100 bpm.  JVP is not elevated decreased air entry bilaterally could not appreciate gallop rub or murmur.  No hepatosplenomegaly no pedal edema.           ASSESSMENT/PLAN      Chin was seen today for follow-up.     Diagnoses and all orders for this visit:     Typical atrial flutter (HCC)     Systolic congestive heart failure, unspecified HF chronicity (HCC)     Other orders  -     metoprolol tartrate (LOPRESSOR) 50 MG tablet; Take 1 tablet by mouth in the morning and 1 tablet at noon and 1 tablet in the evening.     Plans for increasing Lopressor to 50 mg p.o. Q8, DC cardioversion, and proceed from there.   Note was reviewed and accepted.  Today for Rt atrial ismuth ablation.  The procedure was discussed with Pt and wife.

## 2024-03-12 NOTE — PROCEDURES
Procedure is typical atrial flutter, right atrial isthmus ablation.    Preoperative diagnosis is symptomatic repeated episodes of atrial flutter with fast ventricular response.    Postoperative diagnosis : The right atrial isthmus was ablated.  No inducible atrial flutter on repeated attempts of induction both in the from the right atrium and from the CS.    Procedure done is radiofrequency ablation of the right atrial isthmus, 3D mapping using Carto, intracardiac echo was used.    Patient was brought to the EP lab in the postabsorptive state.  Vitals were stable.  Informed consent was obtained.  The right groin was prepared and draped in the usual manner.  The right groin was infiltrated with 2% Xylocaine.  Guided by ultrasonography the right femoral vein was captured 3 separate times.  3 separate guidewires were advanced.  #9 Surinamese and #8 Surinamese and 8 Surinamese sheaths were introducers were advanced over one of the guidewires at that time.  The dilators and the guidewires were removed.    Intracardiac echo was advanced to the right atrium.  It was used to help guide the location of the ablation catheter to the right isthmus.  It was also used to get into the right ventricle at baseline and there was no pericardial effusion and at the termination of the procedure we repeated the same and there was no pericardial effusion at the termination of the procedure.    A David -Decca catheter was advanced under fluoroscopy to pace the right atrium and to pace and map in the CS.    Hiss location was identified immediately using the mapping catheter.  CS was identified and also.  The right atrial isthmus was identified both by the ice catheter and under fluoroscopic guidance.    The ice [place] system was used cart dose system was used to identify the isthmus.  Guided by that and by anatomy and eyes we ablated the right atrial isthmus repeatedly.    The area of the right atrial isthmus was densely ablated.  We attempted to pace

## 2024-03-12 NOTE — DISCHARGE SUMMARY
Discharge Summary    Date: 3/12/2024  Patient Name: Chin Vergara    YOB: 1965     Age: 58 y.o.    Admit Date: 3/12/2024  Discharge Date: 3/12/2024  Discharge Condition: Good    Admission Diagnosis  Atrial flutter, unspecified type (HCC) [I48.92]      Discharge Diagnosis  Active Problems:    * No active hospital problems. *  Resolved Problems:    * No resolved hospital problems. *      Hospital Stay  Narrative of Hospital Course:  Rf ablationof Rt atrial ismuth    Consultants:  None    Surgeries/procedures Performed:      Treatments:            Discharge Plan/Disposition:  Home    Hospital/Incidental Findings Requiring Follow Up:    Patient Instructions:    Diet: Low Fat, Low Cholesterol Diet    Activity:Activiity as Tolerated, No Driving for Today, Ambulate in House and No Lifting, Driving or Strenuous Excercise  For number of days (if applicable):      Other Instructions:    Provider Follow-Up:   No follow-ups on file.     Significant Diagnostic Studies:    Recent Labs:  Admission on 03/12/2024  Sodium                                        Date: 03/12/2024  Value: 143         Ref range: 136 - 145 mmol/L   Status: Final  Potassium                                     Date: 03/12/2024  Value: 4.4         Ref range: 3.7 - 5.3 mmol/L   Status: Final  Chloride                                      Date: 03/12/2024  Value: 108 (H)     Ref range: 98 - 107 mmol/L    Status: Final  CO2                                           Date: 03/12/2024  Value: 23          Ref range: 20 - 31 mmol/L     Status: Final  Anion Gap                                     Date: 03/12/2024  Value: 12          Ref range: 9 - 16 mmol/L      Status: Final  Glucose                                       Date: 03/12/2024  Value: 114 (H)     Ref range: 74 - 99 mg/dL      Status: Final  BUN                                           Date: 03/12/2024  Value: 15          Ref range: 8 - 23 mg/dL       Status: Final  Creatinine

## 2024-03-12 NOTE — PROGRESS NOTES
Returned to [facility] post procedure. Alert. Wife at bedside. HOB flat. No c/o discomfort.

## 2024-04-08 ENCOUNTER — OFFICE VISIT (OUTPATIENT)
Age: 59
End: 2024-04-08
Payer: COMMERCIAL

## 2024-04-08 VITALS
OXYGEN SATURATION: 98 % | TEMPERATURE: 97.2 F | HEIGHT: 72 IN | HEART RATE: 67 BPM | WEIGHT: 206 LBS | SYSTOLIC BLOOD PRESSURE: 142 MMHG | BODY MASS INDEX: 27.9 KG/M2 | DIASTOLIC BLOOD PRESSURE: 89 MMHG

## 2024-04-08 DIAGNOSIS — I48.3 TYPICAL ATRIAL FLUTTER (HCC): Primary | ICD-10-CM

## 2024-04-08 PROCEDURE — 99213 OFFICE O/P EST LOW 20 MIN: CPT | Performed by: SPECIALIST

## 2024-04-08 NOTE — PROGRESS NOTES
[facility] CARDIAC ELECTROPHYSIOLOGY  [address], [address]  [facility]  [address]    Date of Visit:  2024  Patient Name: Chin Vergara   Patient :  1965   Referring By:  No ref. provider found     CHIEF COMPLAINT/HPI:     Chin Vergara is a 58 y.o. male who presents today for an general visit to be evaluated for the following condition(s):  Chief Complaint   Patient presents with    Post-Op Check     S/p Ablation   Right atrial isthmus ablation was done almost a month ago.  It went uncomplicated.  Patient is here for follow-up gives no symptoms suggest recurrence.    He continues on blood thinners for now.  He understands that he needs to be on it for long time because of the thromboembolic events that is associated with flutter and because of the risk of atrial fibrillation.  He is off antiarrhythmics.    He wants to have colonoscopy done and I told him 2 weeks from now which will be a decent time to have it done.    Will provide him with 3 days event monitor looking for silent atrial flutter or atrial fibrillation.        REVIEW OF SYSTEM      Review of Systems noncontributory.    REVIEWED INFORMATION      Allergies   Allergen Reactions    Levaquin [Levofloxacin In D5w] Hives       Current Outpatient Medications   Medication Sig Dispense Refill    apixaban (ELIQUIS) 5 MG TABS tablet Take 1 tablet by mouth 2 times daily 60 tablet 3    ibuprofen (ADVIL;MOTRIN) 200 MG tablet Take 1 tablet by mouth every 6 hours as needed      glucosamine-chondroitin 500-400 MG tablet Take 1 tablet by mouth daily      Magnesium 300 MG CAPS Take 350 mg by mouth Take one tablet by mouth daily      Multiple Vitamins-Minerals (THERAPEUTIC MULTIVITAMIN-MINERALS) tablet Take 1 tablet by mouth daily       No current facility-administered medications for this visit.        Patient Active Problem List   Diagnosis    Otitis media    Tendonitis of shoulder    Fatigue    Herniated disc, cervical    Bilateral carotid artery

## 2024-04-30 ENCOUNTER — HOSPITAL ENCOUNTER (OUTPATIENT)
Age: 59
Setting detail: SPECIMEN
Discharge: HOME | End: 2024-04-30

## 2024-05-01 DIAGNOSIS — R30.0 BURNING WITH URINATION: ICD-10-CM

## 2024-05-02 LAB
MICROORGANISM SPEC CULT: NO GROWTH
SPECIMEN DESCRIPTION: NORMAL

## 2024-05-06 DIAGNOSIS — I48.3 TYPICAL ATRIAL FLUTTER (HCC): ICD-10-CM

## 2024-05-24 ENCOUNTER — TELEPHONE (OUTPATIENT)
Age: 59
End: 2024-05-24

## 2024-05-24 ENCOUNTER — OFFICE VISIT (OUTPATIENT)
Age: 59
End: 2024-05-24
Payer: COMMERCIAL

## 2024-05-24 VITALS
BODY MASS INDEX: 27.5 KG/M2 | SYSTOLIC BLOOD PRESSURE: 154 MMHG | WEIGHT: 202.8 LBS | OXYGEN SATURATION: 98 % | HEART RATE: 70 BPM | DIASTOLIC BLOOD PRESSURE: 91 MMHG

## 2024-05-24 DIAGNOSIS — I48.92 ATRIAL FLUTTER, UNSPECIFIED TYPE (HCC): Primary | ICD-10-CM

## 2024-05-24 PROCEDURE — 99214 OFFICE O/P EST MOD 30 MIN: CPT | Performed by: INTERNAL MEDICINE

## 2024-05-24 PROCEDURE — 93000 ELECTROCARDIOGRAM COMPLETE: CPT | Performed by: INTERNAL MEDICINE

## 2024-05-24 RX ORDER — ASPIRIN 81 MG/1
81 TABLET, CHEWABLE ORAL 2 TIMES DAILY
COMMUNITY

## 2024-05-24 NOTE — TELEPHONE ENCOUNTER
Patient was at check out and wasn't sure if he needed a follow up appointment. I did not see a  note, so I told him the office would reach out if needed.

## 2024-05-24 NOTE — PROGRESS NOTES
Cardiology Office Consultation           CC: Patient is here to establish cardiac care for atrial flutter.     HPI  Chin Vergara is here to establish cardiac care.  He has significant history of paroxysmal atrial flutter requiring DIANNA guided cardioversion in the past.  He recently underwent atrial flutter ablation by Dr. Liborio Brock in April 2024.  He has been maintaining normal sinus rhythm since then.  Did have Holter monitor earlier this month which did not show any episodes of A-fib or flutter.  He could not tolerate Eliquis or Xarelto due to muscle aches and pains and and stopped taking it few days ago.  He does not wish to go back to DOAC or any anticoagulation.  He is only taking aspirin 81 mg daily.      Past Medical:  No past medical history on file.    Past Surgical:  Past Surgical History:   Procedure Laterality Date    CARDIOVERSION  07/15/2021    CARDIOVERSION N/A 02/23/2024    DR. GU    COLONOSCOPY      EP DEVICE PROCEDURE N/A 3/12/2024    [place] / Ablation A-flutter (no anes) / carto performed by Durga Kuhn MD at [facility] CARDIAC CATH LAB    EP STUDY/ABLATION N/A 03/12/2024    A-FLUTTER ABLATION    DR. KUHN    HYDROCELE EXCISION      KNEE SURGERY      NECK SURGERY      TRANSESOPHAGEAL ECHOCARDIOGRAM  07/15/2021    WISDOM TOOTH EXTRACTION         Family History:  No family history on file.    Social History:  Social History     Tobacco Use    Smoking status: Never    Smokeless tobacco: Never   Substance Use Topics    Alcohol use: Yes     Comment: socially        REVIEW OF SYSTEMS:    Constitutional: there has been no unanticipated weight loss. There's been No change in energy level, No change in activity level.     Eyes: No visual changes or diplopia. No scleral icterus.  ENT: No Headaches, hearing loss or vertigo. No mouth sores or sore throat.  Cardiovascular: As described in HPI.   Respiratory: AS HPI  Gastrointestinal: No abdominal pain, appetite loss, blood in stools. No change

## 2024-08-16 DIAGNOSIS — I42.9 CARDIOMYOPATHY, UNSPECIFIED TYPE (HCC): Primary | ICD-10-CM

## 2024-08-30 ENCOUNTER — HOSPITAL ENCOUNTER (OUTPATIENT)
Age: 59
Discharge: HOME | End: 2024-08-30
Attending: INTERNAL MEDICINE
Payer: COMMERCIAL

## 2024-08-30 DIAGNOSIS — I42.9 CARDIOMYOPATHY, UNSPECIFIED TYPE (HCC): ICD-10-CM

## 2024-08-30 LAB
ECHO AO ROOT DIAM: 3.9 CM
ECHO AV PEAK GRADIENT: 3 MMHG
ECHO AV PEAK VELOCITY: 0.9 M/S
ECHO EST RA PRESSURE: 3 MMHG
ECHO LA AREA 4C: 21.5 CM2
ECHO LA DIAMETER: 4.2 CM
ECHO LA MAJOR AXIS: 6.1 CM
ECHO LA TO AORTIC ROOT RATIO: 1.08
ECHO LA VOL MOD A4C: 60 ML (ref 18–58)
ECHO LV E' LATERAL VELOCITY: 9 CM/S
ECHO LV E' SEPTAL VELOCITY: 8 CM/S
ECHO LV EDV A2C: 100 ML
ECHO LV EDV A4C: 109 ML
ECHO LV EJECTION FRACTION A2C: 58 %
ECHO LV EJECTION FRACTION A4C: 44 %
ECHO LV EJECTION FRACTION BIPLANE: 52 % (ref 55–100)
ECHO LV ESV A2C: 42 ML
ECHO LV ESV A4C: 61 ML
ECHO LV FRACTIONAL SHORTENING: 27 % (ref 28–44)
ECHO LV INTERNAL DIMENSION DIASTOLIC: 4.5 CM (ref 4.2–5.9)
ECHO LV INTERNAL DIMENSION SYSTOLIC: 3.3 CM
ECHO LV IVSD: 1.2 CM (ref 0.6–1)
ECHO LV MASS 2D: 198.1 G (ref 88–224)
ECHO LV POSTERIOR WALL DIASTOLIC: 1.2 CM (ref 0.6–1)
ECHO LV RELATIVE WALL THICKNESS RATIO: 0.53
ECHO MV A VELOCITY: 0.49 M/S
ECHO MV E DECELERATION TIME (DT): 197 MS
ECHO MV E VELOCITY: 0.35 M/S
ECHO MV E/A RATIO: 0.71
ECHO MV E/E' LATERAL: 3.89
ECHO MV E/E' RATIO (AVERAGED): 4.13
ECHO MV E/E' SEPTAL: 4.38
ECHO RIGHT VENTRICULAR SYSTOLIC PRESSURE (RVSP): 10 MMHG
ECHO TV REGURGITANT MAX VELOCITY: 1.35 M/S
ECHO TV REGURGITANT PEAK GRADIENT: 7 MMHG

## 2024-08-30 PROCEDURE — 93306 TTE W/DOPPLER COMPLETE: CPT

## 2024-09-03 ENCOUNTER — TELEPHONE (OUTPATIENT)
Age: 59
End: 2024-09-03

## 2024-09-19 ENCOUNTER — OFFICE VISIT (OUTPATIENT)
Age: 59
End: 2024-09-19
Payer: COMMERCIAL

## 2024-09-19 VITALS
HEART RATE: 70 BPM | BODY MASS INDEX: 28.24 KG/M2 | OXYGEN SATURATION: 99 % | WEIGHT: 208.2 LBS | DIASTOLIC BLOOD PRESSURE: 93 MMHG | SYSTOLIC BLOOD PRESSURE: 134 MMHG

## 2024-09-19 DIAGNOSIS — I48.3 TYPICAL ATRIAL FLUTTER (HCC): Primary | ICD-10-CM

## 2024-09-19 DIAGNOSIS — I48.92 ATRIAL FLUTTER, UNSPECIFIED TYPE (HCC): ICD-10-CM

## 2024-09-19 PROCEDURE — 99214 OFFICE O/P EST MOD 30 MIN: CPT

## 2024-09-19 PROCEDURE — 93000 ELECTROCARDIOGRAM COMPLETE: CPT

## 2024-09-19 ASSESSMENT — ENCOUNTER SYMPTOMS
EYES NEGATIVE: 1
CHEST TIGHTNESS: 0
SHORTNESS OF BREATH: 0
GASTROINTESTINAL NEGATIVE: 1

## 2024-09-27 ENCOUNTER — TELEPHONE (OUTPATIENT)
Age: 59
End: 2024-09-27

## 2024-12-21 ENCOUNTER — APPOINTMENT (OUTPATIENT)
Dept: GENERAL RADIOLOGY | Age: 59
End: 2024-12-21
Payer: COMMERCIAL

## 2024-12-21 ENCOUNTER — HOSPITAL ENCOUNTER (INPATIENT)
Age: 59
LOS: 2 days | Discharge: HOME | End: 2024-12-23
Attending: EMERGENCY MEDICINE | Admitting: FAMILY MEDICINE
Payer: COMMERCIAL

## 2024-12-21 DIAGNOSIS — I48.3 TYPICAL ATRIAL FLUTTER (HCC): ICD-10-CM

## 2024-12-21 DIAGNOSIS — R00.2 PALPITATIONS: Primary | ICD-10-CM

## 2024-12-21 DIAGNOSIS — I48.92 ATRIAL FLUTTER, UNSPECIFIED TYPE (HCC): ICD-10-CM

## 2024-12-21 PROBLEM — Z98.890 H/O CARDIAC RADIOFREQUENCY ABLATION: Status: ACTIVE | Noted: 2024-12-21

## 2024-12-21 PROBLEM — I10 PRIMARY HYPERTENSION: Status: ACTIVE | Noted: 2024-12-21

## 2024-12-21 PROBLEM — E27.9 ADRENAL NODULE (HCC): Status: ACTIVE | Noted: 2022-05-10

## 2024-12-21 LAB
ANION GAP SERPL CALCULATED.3IONS-SCNC: 12 MMOL/L (ref 9–16)
BASOPHILS # BLD: 0.03 K/UL (ref 0–0.2)
BASOPHILS NFR BLD: 1 % (ref 0–2)
BUN SERPL-MCNC: 19 MG/DL (ref 8–23)
CA-I BLD-SCNC: 1.18 MMOL/L (ref 1.13–1.33)
CALCIUM SERPL-MCNC: 9.4 MG/DL (ref 8.6–10.4)
CHLORIDE SERPL-SCNC: 106 MMOL/L (ref 98–107)
CO2 SERPL-SCNC: 24 MMOL/L (ref 20–31)
CREAT SERPL-MCNC: 1.2 MG/DL (ref 0.7–1.2)
EOSINOPHIL # BLD: 0.19 K/UL (ref 0–0.44)
EOSINOPHILS RELATIVE PERCENT: 3 % (ref 1–4)
ERYTHROCYTE [DISTWIDTH] IN BLOOD BY AUTOMATED COUNT: 13.1 % (ref 11.8–14.4)
GFR, ESTIMATED: 69 ML/MIN/1.73M2
GLUCOSE SERPL-MCNC: 87 MG/DL (ref 74–99)
HCT VFR BLD AUTO: 43.3 % (ref 40.7–50.3)
HGB BLD-MCNC: 14.2 G/DL (ref 13–17)
IMM GRANULOCYTES # BLD AUTO: <0.03 K/UL (ref 0–0.3)
IMM GRANULOCYTES NFR BLD: 0 %
LYMPHOCYTES NFR BLD: 1.78 K/UL (ref 1.1–3.7)
LYMPHOCYTES RELATIVE PERCENT: 28 % (ref 24–43)
MAGNESIUM SERPL-MCNC: 2.5 MG/DL (ref 1.6–2.4)
MCH RBC QN AUTO: 32 PG (ref 25.2–33.5)
MCHC RBC AUTO-ENTMCNC: 32.8 G/DL (ref 28.4–34.8)
MCV RBC AUTO: 97.5 FL (ref 82.6–102.9)
MONOCYTES NFR BLD: 0.85 K/UL (ref 0.1–1.2)
MONOCYTES NFR BLD: 13 % (ref 3–12)
NEUTROPHILS NFR BLD: 55 % (ref 36–65)
NEUTS SEG NFR BLD: 3.45 K/UL (ref 1.5–8.1)
NRBC BLD-RTO: 0 PER 100 WBC
PARTIAL THROMBOPLASTIN TIME: 26.7 SEC (ref 23–36.5)
PHOSPHATE SERPL-MCNC: 3.5 MG/DL (ref 2.5–4.5)
PLATELET # BLD AUTO: 236 K/UL (ref 138–453)
PMV BLD AUTO: 10.9 FL (ref 8.1–13.5)
POTASSIUM SERPL-SCNC: 4 MMOL/L (ref 3.7–5.3)
RBC # BLD AUTO: 4.44 M/UL (ref 4.21–5.77)
SODIUM SERPL-SCNC: 142 MMOL/L (ref 136–145)
TROPONIN I SERPL HS-MCNC: 16 NG/L (ref 0–22)
TROPONIN I SERPL HS-MCNC: 16 NG/L (ref 0–22)
TSH SERPL DL<=0.05 MIU/L-ACNC: 2.27 UIU/ML (ref 0.27–4.2)
WBC OTHER # BLD: 6.3 K/UL (ref 3.5–11.3)

## 2024-12-21 PROCEDURE — 82330 ASSAY OF CALCIUM: CPT

## 2024-12-21 PROCEDURE — 96374 THER/PROPH/DIAG INJ IV PUSH: CPT

## 2024-12-21 PROCEDURE — 80048 BASIC METABOLIC PNL TOTAL CA: CPT

## 2024-12-21 PROCEDURE — 85025 COMPLETE CBC W/AUTO DIFF WBC: CPT

## 2024-12-21 PROCEDURE — 2500000003 HC RX 250 WO HCPCS

## 2024-12-21 PROCEDURE — 93005 ELECTROCARDIOGRAM TRACING: CPT

## 2024-12-21 PROCEDURE — 84443 ASSAY THYROID STIM HORMONE: CPT

## 2024-12-21 PROCEDURE — 2500000003 HC RX 250 WO HCPCS: Performed by: NURSE PRACTITIONER

## 2024-12-21 PROCEDURE — 85730 THROMBOPLASTIN TIME PARTIAL: CPT

## 2024-12-21 PROCEDURE — 83735 ASSAY OF MAGNESIUM: CPT

## 2024-12-21 PROCEDURE — 84100 ASSAY OF PHOSPHORUS: CPT

## 2024-12-21 PROCEDURE — 2500000003 HC RX 250 WO HCPCS: Performed by: EMERGENCY MEDICINE

## 2024-12-21 PROCEDURE — 99285 EMERGENCY DEPT VISIT HI MDM: CPT

## 2024-12-21 PROCEDURE — 96375 TX/PRO/DX INJ NEW DRUG ADDON: CPT

## 2024-12-21 PROCEDURE — 2580000003 HC RX 258

## 2024-12-21 PROCEDURE — 71045 X-RAY EXAM CHEST 1 VIEW: CPT

## 2024-12-21 PROCEDURE — 2060000000 HC ICU INTERMEDIATE R&B

## 2024-12-21 PROCEDURE — 84484 ASSAY OF TROPONIN QUANT: CPT

## 2024-12-21 PROCEDURE — 99222 1ST HOSP IP/OBS MODERATE 55: CPT | Performed by: NURSE PRACTITIONER

## 2024-12-21 PROCEDURE — 6360000002 HC RX W HCPCS

## 2024-12-21 RX ORDER — ACETAMINOPHEN 650 MG/1
650 SUPPOSITORY RECTAL EVERY 6 HOURS PRN
Status: DISCONTINUED | OUTPATIENT
Start: 2024-12-21 | End: 2024-12-23 | Stop reason: HOSPADM

## 2024-12-21 RX ORDER — ACETAMINOPHEN 325 MG/1
650 TABLET ORAL EVERY 6 HOURS PRN
Status: DISCONTINUED | OUTPATIENT
Start: 2024-12-21 | End: 2024-12-23 | Stop reason: HOSPADM

## 2024-12-21 RX ORDER — POTASSIUM CHLORIDE 7.45 MG/ML
10 INJECTION INTRAVENOUS PRN
Status: DISCONTINUED | OUTPATIENT
Start: 2024-12-21 | End: 2024-12-23 | Stop reason: HOSPADM

## 2024-12-21 RX ORDER — HEPARIN SODIUM 1000 [USP'U]/ML
4000 INJECTION, SOLUTION INTRAVENOUS; SUBCUTANEOUS ONCE
Status: COMPLETED | OUTPATIENT
Start: 2024-12-21 | End: 2024-12-21

## 2024-12-21 RX ORDER — SODIUM CHLORIDE 9 MG/ML
INJECTION, SOLUTION INTRAVENOUS PRN
Status: DISCONTINUED | OUTPATIENT
Start: 2024-12-21 | End: 2024-12-23 | Stop reason: HOSPADM

## 2024-12-21 RX ORDER — ONDANSETRON 4 MG/1
4 TABLET, ORALLY DISINTEGRATING ORAL EVERY 8 HOURS PRN
Status: DISCONTINUED | OUTPATIENT
Start: 2024-12-21 | End: 2024-12-23 | Stop reason: HOSPADM

## 2024-12-21 RX ORDER — SODIUM CHLORIDE 0.9 % (FLUSH) 0.9 %
10 SYRINGE (ML) INJECTION PRN
Status: DISCONTINUED | OUTPATIENT
Start: 2024-12-21 | End: 2024-12-23 | Stop reason: HOSPADM

## 2024-12-21 RX ORDER — MAGNESIUM SULFATE IN WATER 40 MG/ML
2000 INJECTION, SOLUTION INTRAVENOUS PRN
Status: DISCONTINUED | OUTPATIENT
Start: 2024-12-21 | End: 2024-12-23 | Stop reason: HOSPADM

## 2024-12-21 RX ORDER — HEPARIN SODIUM 10000 [USP'U]/100ML
5-30 INJECTION, SOLUTION INTRAVENOUS CONTINUOUS
Status: DISCONTINUED | OUTPATIENT
Start: 2024-12-21 | End: 2024-12-22

## 2024-12-21 RX ORDER — POLYETHYLENE GLYCOL 3350 17 G/17G
17 POWDER, FOR SOLUTION ORAL DAILY PRN
Status: DISCONTINUED | OUTPATIENT
Start: 2024-12-21 | End: 2024-12-23 | Stop reason: HOSPADM

## 2024-12-21 RX ORDER — M-VIT,TX,IRON,MINS/CALC/FOLIC 27MG-0.4MG
1 TABLET ORAL DAILY
Status: DISCONTINUED | OUTPATIENT
Start: 2024-12-22 | End: 2024-12-23 | Stop reason: HOSPADM

## 2024-12-21 RX ORDER — ONDANSETRON 2 MG/ML
4 INJECTION INTRAMUSCULAR; INTRAVENOUS EVERY 6 HOURS PRN
Status: DISCONTINUED | OUTPATIENT
Start: 2024-12-21 | End: 2024-12-23 | Stop reason: HOSPADM

## 2024-12-21 RX ORDER — HEPARIN SODIUM 1000 [USP'U]/ML
4000 INJECTION, SOLUTION INTRAVENOUS; SUBCUTANEOUS PRN
Status: DISCONTINUED | OUTPATIENT
Start: 2024-12-21 | End: 2024-12-23

## 2024-12-21 RX ORDER — HEPARIN SODIUM 1000 [USP'U]/ML
2000 INJECTION, SOLUTION INTRAVENOUS; SUBCUTANEOUS PRN
Status: DISCONTINUED | OUTPATIENT
Start: 2024-12-21 | End: 2024-12-23

## 2024-12-21 RX ORDER — POTASSIUM CHLORIDE 1500 MG/1
40 TABLET, EXTENDED RELEASE ORAL PRN
Status: DISCONTINUED | OUTPATIENT
Start: 2024-12-21 | End: 2024-12-23 | Stop reason: HOSPADM

## 2024-12-21 RX ORDER — DILTIAZEM HYDROCHLORIDE 5 MG/ML
10 INJECTION INTRAVENOUS ONCE
Status: COMPLETED | OUTPATIENT
Start: 2024-12-21 | End: 2024-12-21

## 2024-12-21 RX ORDER — ASPIRIN 81 MG/1
81 TABLET, CHEWABLE ORAL DAILY
Status: DISCONTINUED | OUTPATIENT
Start: 2024-12-22 | End: 2024-12-23

## 2024-12-21 RX ORDER — SODIUM CHLORIDE 0.9 % (FLUSH) 0.9 %
5-40 SYRINGE (ML) INJECTION EVERY 12 HOURS SCHEDULED
Status: DISCONTINUED | OUTPATIENT
Start: 2024-12-21 | End: 2024-12-23 | Stop reason: HOSPADM

## 2024-12-21 RX ADMIN — HEPARIN SODIUM 10 UNITS/KG/HR: 10000 INJECTION, SOLUTION INTRAVENOUS at 20:50

## 2024-12-21 RX ADMIN — HEPARIN SODIUM 4000 UNITS: 1000 INJECTION INTRAVENOUS; SUBCUTANEOUS at 20:48

## 2024-12-21 RX ADMIN — DILTIAZEM HYDROCHLORIDE 5 MG/HR: 5 INJECTION, SOLUTION INTRAVENOUS at 21:28

## 2024-12-21 RX ADMIN — SODIUM CHLORIDE, PRESERVATIVE FREE 10 ML: 5 INJECTION INTRAVENOUS at 21:58

## 2024-12-21 RX ADMIN — DILTIAZEM HYDROCHLORIDE 10 MG: 5 INJECTION, SOLUTION INTRAVENOUS at 20:08

## 2024-12-21 NOTE — ED PROVIDER NOTES
1842    Rate: Tachycardia   Rhythm: Atrial Fibrillation - Rapid  Intervals:   -- [place]: N/A   -- QRS: Normal  -- QTc: Normal   Axis: Normal  Ectopy: Premature Ventricular Contractions (Unifocal)  Conduction: Normal  Ischemia:   -- ST Segments: Nonspecific Changes  -- T Waves: Non Specific Changes  -- Q Waves: None  OTHER: N/A    Clinical Impression: Atrial flutter with rapid conduction with PVC with nonspecific ST and T wave change      Elias Escamilla DO  Emergency Medicine Resident  [place], [place]  12/21/24 7:12 PM EST    All EKG's are interpreted by the Emergency Department Physician who either signs or Co-signs this chart in the absence of a cardiologist.    EMERGENCY DEPARTMENT COURSE:      ED Course as of 12/21/24 2155   Sat Dec 21, 2024   1844 Pulse(!): 116 [HELEN]   1912 EKG 12 Lead    EKG Interpretation    Date and Time ECG Performed: 12/21/2024 @ 1842    Rate: Tachycardia   Rhythm: Atrial Fibrillation - Rapid  Intervals:   -- AZ: N/A   -- QRS: Normal  -- QTc: Normal   Axis: Normal  Ectopy: Premature Ventricular Contractions (Unifocal)  Conduction: Normal  Ischemia:   -- ST Segments: Nonspecific Changes  -- T Waves: Non Specific Changes  -- Q Waves: None  OTHER: N/A    Clinical Impression: Atrial flutter with rapid conduction with PVC with nonspecific ST and T wave change      Elias Escamilla DO  Emergency Medicine Resident  [place], [place]  12/21/24 7:12 PM EST   [HELEN]   1916 Calcium, Ionized: 1.18  Within normal limits [EHLEN]   1950 Phosphorus: 3.5 [HELEN]   1950 TSH, 3rd Generation: 2.27 [HELEN]   1950 Magnesium(!): 2.5 [HELEN]   1951 Troponin, High Sensitivity: 16 [HELEN]   2000 XR CHEST PORTABLE  FINDINGS:  There is cervical spinal fixation hardware.     The cardiomediastinal silhouette is within normal limits.     No focal airspace consolidation, pneumothorax, or sizeable pleural effusion.     IMPRESSION:  No acute cardiopulmonary abnormality.   [HELEN]    2013 Pulse(!): 141  Heart rate initially 100-1 20s now slowly uptrending to the 120s to 140s. [HELEN]   2015 Patient given 10 mg bolus of diltiazem with improvement in heart rate down to the 100s to 110s. [HELEN]      ED Course User Index  [HELEN] Elias Escamilla DO       PROCEDURES:      CONSULTS:  IP CONSULT TO CARDIOLOGY  IP CONSULT TO HOSPITALIST    CRITICAL CARE:  There was significant risk of life threatening deterioration of patient's condition requiring my direct management. Critical care time 15 minutes, excluding any documented procedures.    FINAL IMPRESSION      1. Palpitations    2. Typical atrial flutter (HCC)          DISPOSITION / PLAN     DISPOSITION Decision To Admit 12/21/2024 08:33:43 PM   DISPOSITION CONDITION Stable           PATIENT REFERRED TO:  No follow-up provider specified.    DISCHARGE MEDICATIONS:  New Prescriptions    No medications on file       Elias Escamilla DO  Emergency Medicine Resident    (Please note that portions of thisnote were completed with a voice recognition program.  Efforts were made to edit the dictations but occasionally words are mis-transcribed.)

## 2024-12-21 NOTE — ED PROVIDER NOTES
[facility]  Emergency Department  Faculty Attestation     I performed a history and physical examination of the patient and discussed management with the resident. I reviewed the resident’s note and agree with the documented findings and plan of care. Any areas of disagreement are noted on the chart. I was personally present for the key portions of any procedures. I have documented in the chart those procedures where I was not present during the key portions. I have reviewed the emergency nurses triage note. I agree with the chief complaint, past medical history, past surgical history, allergies, medications, social and family history as documented unless otherwise noted below.    For Physician Assistant/ Nurse Practitioner cases/documentation I have personally evaluated this patient and have completed at least one if not all key elements of the E/M (history, physical exam, and MDM). Additional findings are as noted.    Preliminary note started at 7:00 PM EST    Primary Care Physician:  Hitesh Ybarra MD    Screenings:  [unfilled]    CHIEF COMPLAINT       Chief Complaint   Patient presents with    Palpitations       RECENT VITALS:   BP (!) 169/95   Pulse 100   Temp 98.1 °F (36.7 °C)   Resp 18   Ht 1.829 m (6')   Wt 93 kg (205 lb)   SpO2 99%   BMI 27.80 kg/m²     LABS:  Labs Reviewed   CBC WITH AUTO DIFFERENTIAL   BASIC METABOLIC PANEL W/ REFLEX TO MG FOR LOW K   TROPONIN   TROPONIN   TSH REFLEX TO FT4   MAGNESIUM   CALCIUM, IONIZED   PHOSPHORUS       Radiology  XR CHEST PORTABLE    (Results Pending)       CRITICAL CARE: There was a high probability of clinically significant/life threatening deterioration in this patient's condition which required my urgent intervention.  Total critical care time was none minutes.  This excludes any time for separately reportable procedures.     EKG:  EKG Interpretation    Interpreted by me    Rhythm: Atrial flutter  Rate:

## 2024-12-22 PROBLEM — Z79.01 CHRONIC ANTICOAGULATION: Status: ACTIVE | Noted: 2024-12-22

## 2024-12-22 PROBLEM — R00.2 PALPITATIONS: Status: ACTIVE | Noted: 2024-12-22

## 2024-12-22 LAB
ANION GAP SERPL CALCULATED.3IONS-SCNC: 12 MMOL/L (ref 9–16)
ANTI-XA UNFRAC HEPARIN: 0.16 IU/L
ANTI-XA UNFRAC HEPARIN: 0.17 IU/L
ANTI-XA UNFRAC HEPARIN: 0.2 IU/L
BUN SERPL-MCNC: 13 MG/DL (ref 8–23)
CALCIUM SERPL-MCNC: 9 MG/DL (ref 8.6–10.4)
CHLORIDE SERPL-SCNC: 109 MMOL/L (ref 98–107)
CO2 SERPL-SCNC: 20 MMOL/L (ref 20–31)
CREAT SERPL-MCNC: 1 MG/DL (ref 0.7–1.2)
ERYTHROCYTE [DISTWIDTH] IN BLOOD BY AUTOMATED COUNT: 13.1 % (ref 11.8–14.4)
GFR, ESTIMATED: 86 ML/MIN/1.73M2
GLUCOSE SERPL-MCNC: 119 MG/DL (ref 74–99)
HCT VFR BLD AUTO: 40.7 % (ref 40.7–50.3)
HGB BLD-MCNC: 12.8 G/DL (ref 13–17)
INR PPP: 1
MAGNESIUM SERPL-MCNC: 2.3 MG/DL (ref 1.6–2.4)
MCH RBC QN AUTO: 32 PG (ref 25.2–33.5)
MCHC RBC AUTO-ENTMCNC: 31.4 G/DL (ref 28.4–34.8)
MCV RBC AUTO: 101.8 FL (ref 82.6–102.9)
NRBC BLD-RTO: 0 PER 100 WBC
PARTIAL THROMBOPLASTIN TIME: 39.7 SEC (ref 23–36.5)
PLATELET # BLD AUTO: 194 K/UL (ref 138–453)
PMV BLD AUTO: 11.6 FL (ref 8.1–13.5)
POTASSIUM SERPL-SCNC: 4.1 MMOL/L (ref 3.7–5.3)
PROTHROMBIN TIME: 13.2 SEC (ref 11.7–14.9)
RBC # BLD AUTO: 4 M/UL (ref 4.21–5.77)
SODIUM SERPL-SCNC: 141 MMOL/L (ref 136–145)
TROPONIN I SERPL HS-MCNC: 12 NG/L (ref 0–22)
WBC OTHER # BLD: 5.3 K/UL (ref 3.5–11.3)

## 2024-12-22 PROCEDURE — 85610 PROTHROMBIN TIME: CPT

## 2024-12-22 PROCEDURE — 99222 1ST HOSP IP/OBS MODERATE 55: CPT | Performed by: INTERNAL MEDICINE

## 2024-12-22 PROCEDURE — 6370000000 HC RX 637 (ALT 250 FOR IP): Performed by: NURSE PRACTITIONER

## 2024-12-22 PROCEDURE — 85520 HEPARIN ASSAY: CPT

## 2024-12-22 PROCEDURE — 99232 SBSQ HOSP IP/OBS MODERATE 35: CPT | Performed by: INTERNAL MEDICINE

## 2024-12-22 PROCEDURE — 36415 COLL VENOUS BLD VENIPUNCTURE: CPT

## 2024-12-22 PROCEDURE — 84484 ASSAY OF TROPONIN QUANT: CPT

## 2024-12-22 PROCEDURE — 2580000003 HC RX 258

## 2024-12-22 PROCEDURE — 80048 BASIC METABOLIC PNL TOTAL CA: CPT

## 2024-12-22 PROCEDURE — 2500000003 HC RX 250 WO HCPCS: Performed by: NURSE PRACTITIONER

## 2024-12-22 PROCEDURE — 2060000000 HC ICU INTERMEDIATE R&B

## 2024-12-22 PROCEDURE — 2500000003 HC RX 250 WO HCPCS

## 2024-12-22 PROCEDURE — 85027 COMPLETE CBC AUTOMATED: CPT

## 2024-12-22 PROCEDURE — 6360000002 HC RX W HCPCS

## 2024-12-22 PROCEDURE — 85730 THROMBOPLASTIN TIME PARTIAL: CPT

## 2024-12-22 PROCEDURE — 93005 ELECTROCARDIOGRAM TRACING: CPT | Performed by: NURSE PRACTITIONER

## 2024-12-22 PROCEDURE — 83735 ASSAY OF MAGNESIUM: CPT

## 2024-12-22 PROCEDURE — 6370000000 HC RX 637 (ALT 250 FOR IP): Performed by: INTERNAL MEDICINE

## 2024-12-22 RX ORDER — HEPARIN SODIUM 1000 [USP'U]/ML
4000 INJECTION, SOLUTION INTRAVENOUS; SUBCUTANEOUS ONCE
Status: DISCONTINUED | OUTPATIENT
Start: 2024-12-22 | End: 2024-12-22

## 2024-12-22 RX ORDER — IBUPROFEN 400 MG/1
400 TABLET, FILM COATED ORAL
Status: COMPLETED | OUTPATIENT
Start: 2024-12-22 | End: 2024-12-22

## 2024-12-22 RX ORDER — HEPARIN SODIUM 10000 [USP'U]/100ML
5-30 INJECTION, SOLUTION INTRAVENOUS CONTINUOUS
Status: DISCONTINUED | OUTPATIENT
Start: 2024-12-22 | End: 2024-12-23

## 2024-12-22 RX ADMIN — IBUPROFEN 400 MG: 400 TABLET, FILM COATED ORAL at 17:29

## 2024-12-22 RX ADMIN — DILTIAZEM HYDROCHLORIDE 10 MG/HR: 5 INJECTION, SOLUTION INTRAVENOUS at 09:23

## 2024-12-22 RX ADMIN — ACETAMINOPHEN 650 MG: 325 TABLET ORAL at 12:16

## 2024-12-22 RX ADMIN — HEPARIN SODIUM 2000 UNITS: 1000 INJECTION INTRAVENOUS; SUBCUTANEOUS at 03:55

## 2024-12-22 RX ADMIN — ASPIRIN 81 MG 81 MG: 81 TABLET ORAL at 09:18

## 2024-12-22 RX ADMIN — HEPARIN SODIUM 2000 UNITS: 1000 INJECTION INTRAVENOUS; SUBCUTANEOUS at 18:38

## 2024-12-22 RX ADMIN — SODIUM CHLORIDE, PRESERVATIVE FREE 10 ML: 5 INJECTION INTRAVENOUS at 09:19

## 2024-12-22 RX ADMIN — ACETAMINOPHEN 650 MG: 325 TABLET ORAL at 19:34

## 2024-12-22 RX ADMIN — SODIUM CHLORIDE, PRESERVATIVE FREE 10 ML: 5 INJECTION INTRAVENOUS at 19:35

## 2024-12-22 RX ADMIN — DILTIAZEM HYDROCHLORIDE 10 MG/HR: 5 INJECTION, SOLUTION INTRAVENOUS at 22:04

## 2024-12-22 RX ADMIN — Medication 1 TABLET: at 09:18

## 2024-12-22 RX ADMIN — HEPARIN SODIUM 10 UNITS/KG/HR: 10000 INJECTION, SOLUTION INTRAVENOUS at 03:15

## 2024-12-22 RX ADMIN — HEPARIN SODIUM 2000 UNITS: 1000 INJECTION INTRAVENOUS; SUBCUTANEOUS at 12:12

## 2024-12-22 RX ADMIN — HEPARIN SODIUM 16 UNITS/KG/HR: 10000 INJECTION, SOLUTION INTRAVENOUS at 18:41

## 2024-12-22 ASSESSMENT — PAIN DESCRIPTION - DESCRIPTORS
DESCRIPTORS: DISCOMFORT;ACHING
DESCRIPTORS: ACHING
DESCRIPTORS: ACHING

## 2024-12-22 ASSESSMENT — PAIN SCALES - GENERAL
PAINLEVEL_OUTOF10: 0
PAINLEVEL_OUTOF10: 0
PAINLEVEL_OUTOF10: 3
PAINLEVEL_OUTOF10: 0
PAINLEVEL_OUTOF10: 4
PAINLEVEL_OUTOF10: 3
PAINLEVEL_OUTOF10: 0

## 2024-12-22 ASSESSMENT — PAIN DESCRIPTION - ORIENTATION
ORIENTATION: INNER
ORIENTATION: MID
ORIENTATION: INNER

## 2024-12-22 ASSESSMENT — PAIN DESCRIPTION - LOCATION
LOCATION: HEAD

## 2024-12-22 ASSESSMENT — PAIN - FUNCTIONAL ASSESSMENT
PAIN_FUNCTIONAL_ASSESSMENT: ACTIVITIES ARE NOT PREVENTED
PAIN_FUNCTIONAL_ASSESSMENT: ACTIVITIES ARE NOT PREVENTED

## 2024-12-22 NOTE — PLAN OF CARE
Problem: Discharge Planning  Goal: Discharge to home or other facility with appropriate resources  Outcome: Progressing  Flowsheets (Taken 12/22/2024 1815)  Discharge to home or other facility with appropriate resources:   Identify barriers to discharge with patient and caregiver   Identify discharge learning needs (meds, wound care, etc)   Refer to discharge planning if patient needs post-hospital services based on physician order or complex needs related to functional status, cognitive ability or social support system   Arrange for needed discharge resources and transportation as appropriate     Problem: Safety - Adult  Goal: Free from fall injury  Outcome: Progressing  Flowsheets (Taken 12/22/2024 1815)  Free From Fall Injury: Instruct family/caregiver on patient safety     Problem: ABCDS Injury Assessment  Goal: Absence of physical injury  Outcome: Progressing  Flowsheets (Taken 12/22/2024 1815)  Absence of Physical Injury: Implement safety measures based on patient assessment     Problem: Cardiovascular - Adult  Goal: Maintains optimal cardiac output and hemodynamic stability  Outcome: Progressing  Flowsheets (Taken 12/22/2024 1815)  Maintains optimal cardiac output and hemodynamic stability: Monitor blood pressure and heart rate  Goal: Absence of cardiac dysrhythmias or at baseline  Outcome: Progressing  Flowsheets (Taken 12/22/2024 1815)  Absence of cardiac dysrhythmias or at baseline: Monitor cardiac rate and rhythm

## 2024-12-22 NOTE — CONSULTS
45% in February 2024.  History of alcohol abuse, has significantly cut down      PLAN:  Continue cardizem and heparin infusion  Will proceed with tata guided CV tomorrow am if rremains in atrial flutter  I plan to start flecainide and toprol xl post cardioversion   Will need follow up with EP as outpatient for further recommendations   Risks, benefits and alternatives of procedure discussed with him in detail.he verbalized understanding and willing to proceed.     Discussed with patient and nursing.      Stef Gaytan MD [facility] [place]

## 2024-12-22 NOTE — PROGRESS NOTES
New admission from ED to room 2026.Patient AOX4, saturating on room air. Placed on cardiac monitor on A.flutter with HR 's bpm, SPO2:97% on room air. Ongoing Heparin infusion running at 10units/kg.hr and Diltiazem gtt at 10mg/hr. No complaints of pain nor shortness of breath. Oriented to room and call light use. Care ongoing.

## 2024-12-22 NOTE — H&P
palpitations nasal congestion    EKG rapid A- flutter nonspecific ST-T changes PVCs    Labs -within normal limit.  Chest x-ray-> no acute process    Blood pressure 138/77, pulse 99, temperature 98.1 °F (36.7 °C), resp. rate 24, height 1.829 m (6'), weight 93 kg (205 lb), SpO2 98%.   Past Medical History:     A-flutter, alcohol misuse, prior cardioversion and A-fib ablation    Past Surgical History:     Past Surgical History:   Procedure Laterality Date    CARDIOVERSION  07/15/2021    CARDIOVERSION N/A 02/23/2024    DR. GU    COLONOSCOPY      EP DEVICE PROCEDURE N/A 3/12/2024    [place] / Ablation A-flutter (no anes) / carto performed by Durga Kuhn MD at [facility] CARDIAC CATH LAB    EP STUDY/ABLATION N/A 03/12/2024    A-FLUTTER ABLATION    DR. KUHN    HYDROCELE EXCISION      KNEE SURGERY      NECK SURGERY      TRANSESOPHAGEAL ECHOCARDIOGRAM  07/15/2021    WISDOM TOOTH EXTRACTION          Medications Prior to Admission:     Prior to Admission medications    Medication Sig Start Date End Date Taking? Authorizing Provider   aspirin 81 MG chewable tablet Take 1 tablet by mouth daily 1 tab in AM 1 tab in PM    Rajan Huddleston MD   apixaban (ELIQUIS) 5 MG TABS tablet Take 1 tablet by mouth 2 times daily 1/23/24 5/22/24  Claudia Rushing MD   ibuprofen (ADVIL;MOTRIN) 200 MG tablet Take 1 tablet by mouth every 6 hours as needed    Rajan Huddleston MD   glucosamine-chondroitin 500-400 MG tablet Take 1 tablet by mouth daily    Rajan Huddleston MD   Magnesium 300 MG CAPS Take 350 mg by mouth Take one tablet by mouth daily    Rajan Huddleston MD   Multiple Vitamins-Minerals (THERAPEUTIC MULTIVITAMIN-MINERALS) tablet Take 1 tablet by mouth daily    Rajan Huddleston MD        Allergies:     Levaquin [levofloxacin in d5w]    Social History:     Tobacco:    reports that he has never smoked. He has never used smokeless tobacco.  Alcohol:      reports current alcohol use.  Drug Use:  has no  34.8 g/dL    RDW 13.1 11.8 - 14.4 %    Platelets 236 138 - 453 k/uL    MPV 10.9 8.1 - 13.5 fL    NRBC Automated 0.0 0.0 per 100 WBC    Neutrophils % 55 36 - 65 %    Lymphocytes % 28 24 - 43 %    Monocytes % 13 (H) 3 - 12 %    Eosinophils % 3 1 - 4 %    Basophils % 1 0 - 2 %    Immature Granulocytes % 0 0 %    Neutrophils Absolute 3.45 1.50 - 8.10 k/uL    Lymphocytes Absolute 1.78 1.10 - 3.70 k/uL    Monocytes Absolute 0.85 0.10 - 1.20 k/uL    Eosinophils Absolute 0.19 0.00 - 0.44 k/uL    Basophils Absolute 0.03 0.00 - 0.20 k/uL    Immature Granulocytes Absolute <0.03 0.00 - 0.30 k/uL   Basic Metabolic Panel w/ Reflex to MG    Collection Time: 12/21/24  6:56 PM   Result Value Ref Range    Sodium 142 136 - 145 mmol/L    Potassium 4.0 3.7 - 5.3 mmol/L    Chloride 106 98 - 107 mmol/L    CO2 24 20 - 31 mmol/L    Anion Gap 12 9 - 16 mmol/L    Glucose 87 74 - 99 mg/dL    BUN 19 8 - 23 mg/dL    Creatinine 1.2 0.7 - 1.2 mg/dL    Est, Glom Filt Rate 69 >60 mL/min/1.73m2    Calcium 9.4 8.6 - 10.4 mg/dL   Troponin    Collection Time: 12/21/24  6:56 PM   Result Value Ref Range    Troponin, High Sensitivity 16 0 - 22 ng/L   TSH reflex to FT4    Collection Time: 12/21/24  6:56 PM   Result Value Ref Range    TSH 2.27 0.27 - 4.20 uIU/mL   Magnesium    Collection Time: 12/21/24  6:56 PM   Result Value Ref Range    Magnesium 2.5 (H) 1.6 - 2.4 mg/dL   Calcium, Ionized    Collection Time: 12/21/24  6:56 PM   Result Value Ref Range    Calcium, Ionized 1.18 1.13 - 1.33 mmol/L   Phosphorus    Collection Time: 12/21/24  6:56 PM   Result Value Ref Range    Phosphorus 3.5 2.5 - 4.5 mg/dL   APTT    Collection Time: 12/21/24  6:56 PM   Result Value Ref Range    APTT 26.7 23.0 - 36.5 sec   Troponin    Collection Time: 12/21/24  8:02 PM   Result Value Ref Range    Troponin, High Sensitivity 16 0 - 22 ng/L       Imaging/Diagnostics:  XR CHEST PORTABLE    Result Date: 12/21/2024  No acute cardiopulmonary abnormality.       Assessment :

## 2024-12-22 NOTE — ED NOTES
Pharmacy notified about pt's missing diltiazem.   
Pt comes to ED with c/o palpitations. Pt states being on flight today, started having palptiations in plane, has hx of afib and aflutter, ablation and shock, took aspirin today, cough today with green production. Pt denies CP, SOB, fever, stroke hx, clot hx. Pt a/o x4, resting on stretcher, attached to monitor, RR even and unlabored, call light within reach, Dr Patel and Dr Escamilla at bedside.   
(ZOFRAN) injection 4 mg    polyethylene glycol (GLYCOLAX) packet 17 g    OR Linked Order Group     acetaminophen (TYLENOL) tablet 650 mg     acetaminophen (TYLENOL) suppository 650 mg       SURGICAL HISTORY       Past Surgical History:   Procedure Laterality Date    CARDIOVERSION  07/15/2021    CARDIOVERSION N/A 02/23/2024    DR. GU    COLONOSCOPY      EP DEVICE PROCEDURE N/A 3/12/2024    [place] / Ablation A-flutter (no anes) / carto performed by Durga Kuhn MD at [facility] CARDIAC CATH LAB    EP STUDY/ABLATION N/A 03/12/2024    A-FLUTTER ABLATION    DR. KUHN    HYDROCELE EXCISION      KNEE SURGERY      NECK SURGERY      TRANSESOPHAGEAL ECHOCARDIOGRAM  07/15/2021    WISDOM TOOTH EXTRACTION         PAST MEDICAL HISTORY     No past medical history on file.    Labs:  Labs Reviewed   CBC WITH AUTO DIFFERENTIAL - Abnormal; Notable for the following components:       Result Value    Monocytes % 13 (*)     All other components within normal limits   MAGNESIUM - Abnormal; Notable for the following components:    Magnesium 2.5 (*)     All other components within normal limits   BASIC METABOLIC PANEL W/ REFLEX TO MG FOR LOW K   TROPONIN   TROPONIN   TSH REFLEX TO FT4   CALCIUM, IONIZED   PHOSPHORUS   APTT   APTT   APTT   BASIC METABOLIC PANEL W/ REFLEX TO MG FOR LOW K   MAGNESIUM   PROTIME-INR       Electronically signed by Cesar White RN on 12/21/2024 at 9:46 PM

## 2024-12-22 NOTE — PROGRESS NOTES
[facility]  Office: [phone]  Bennie Lopez DO, Dangelo Thomas DO, Bari Padilla DO, Javier Lea DO, Nannette Plummer MD, Samantha Garay MD, Mera Nguyễn MD, Mayelin Edwards MD,  Ran Last MD, Ko Potter MD, Alana Keita MD,  Masha Hartmann DO, Annabel Agrawal MD, Geovanny Parsons MD, Sreedhar Lopez DO, Magali Beavers MD,  Panfilo Melo DO, Chandni Miller MD, Celsa Hogan MD, Radha Ngo MD, Damaris Quintanilla MD,  Mario Taylor MD, Sage White MD, Caitie Perry MD, Adelaida Rubio MD, Barry Washington MD, Rachna Lawton MD, Odell Mccormick DO, He Ledesma MD, Shirley Waterhouse, CNP,  Fanny Altamirano CNP, Odell Gomez, ALEX,  Taty Valdez, ANSHU, Gloria Ayala, CNP, Adriana Rubin, CNP, Yvrose Siddiqi, CNP, Vivienne Conner, CNP, Chata Juarez PA-C, HUGH AmadorC, Ananya Smith, CNP, Juan M Lopez, CNP,  Mera Jones, CNP, Emily Hernandez, CNP,  Temitope Carreon, CNP, Kenisha Vieira, CNP         IN-PATIENT SERVICE  [facility]    Progress Note    12/22/2024    9:41 AM    Name:   Chin Vergara  MRN:     [id number]     Acct:      [id number]   Room:   2026/2026-01  IP Day:  1  Admit Date:  12/21/2024  6:43 PM    PCP:   Hitesh Ybarra MD  Code Status:  Full Code    Subjective:     C/C:   Chief Complaint   Patient presents with    Palpitations     Interval History Status:   Comfortable  Reporting episode of nonexertional chest tightness last night?  Remains in atrial flutter    Data Base Updates:  Tachycardic     EKG:  Atrial flutter with 4:1 A-V conduction   ST elevation consider inferior injury or acute infarct   ** ** ACUTE MI / STEMI ** **   Abnormal ECG   When compared with ECG of 21-DEC-2024 18:42,   Vent. rate has decreased BY  57 BPM   ST elevation has replaced ST depression in Inferior leads   ST no longer depressed in Anterolateral leads   Nonspecific T wave abnormality no longer evident in Inferior leads    Brief History:     As

## 2024-12-23 ENCOUNTER — OFFICE VISIT (OUTPATIENT)
Dept: OPERATING ROOM | Age: 59
End: 2024-12-23
Attending: INTERNAL MEDICINE

## 2024-12-23 ENCOUNTER — HOSPITAL ENCOUNTER (INPATIENT)
Age: 59
Discharge: HOME | End: 2024-12-25
Attending: INTERNAL MEDICINE
Payer: COMMERCIAL

## 2024-12-23 VITALS
OXYGEN SATURATION: 94 % | DIASTOLIC BLOOD PRESSURE: 74 MMHG | WEIGHT: 205 LBS | TEMPERATURE: 98.5 F | HEIGHT: 72 IN | SYSTOLIC BLOOD PRESSURE: 116 MMHG | RESPIRATION RATE: 18 BRPM | HEART RATE: 98 BPM | BODY MASS INDEX: 27.77 KG/M2

## 2024-12-23 VITALS
SYSTOLIC BLOOD PRESSURE: 125 MMHG | OXYGEN SATURATION: 98 % | DIASTOLIC BLOOD PRESSURE: 93 MMHG | HEART RATE: 75 BPM | HEIGHT: 72 IN | BODY MASS INDEX: 27.77 KG/M2 | RESPIRATION RATE: 20 BRPM | WEIGHT: 205 LBS

## 2024-12-23 DIAGNOSIS — I65.23 BILATERAL CAROTID ARTERY STENOSIS: Primary | ICD-10-CM

## 2024-12-23 DIAGNOSIS — I48.92 ATRIAL FLUTTER (HCC): ICD-10-CM

## 2024-12-23 LAB
ANION GAP SERPL CALCULATED.3IONS-SCNC: 13 MMOL/L (ref 9–16)
ANTI-XA UNFRAC HEPARIN: 0.45 IU/L
ANTI-XA UNFRAC HEPARIN: 0.46 IU/L
BUN SERPL-MCNC: 13 MG/DL (ref 8–23)
CALCIUM SERPL-MCNC: 9 MG/DL (ref 8.6–10.4)
CHLORIDE SERPL-SCNC: 110 MMOL/L (ref 98–107)
CO2 SERPL-SCNC: 21 MMOL/L (ref 20–31)
CREAT SERPL-MCNC: 1.1 MG/DL (ref 0.7–1.2)
ECHO BSA: 2.17 M2
GFR, ESTIMATED: 76 ML/MIN/1.73M2
GLUCOSE SERPL-MCNC: 109 MG/DL (ref 74–99)
POTASSIUM SERPL-SCNC: 3.9 MMOL/L (ref 3.7–5.3)
SODIUM SERPL-SCNC: 144 MMOL/L (ref 136–145)

## 2024-12-23 PROCEDURE — 99152 MOD SED SAME PHYS/QHP 5/>YRS: CPT | Performed by: INTERNAL MEDICINE

## 2024-12-23 PROCEDURE — 93005 ELECTROCARDIOGRAM TRACING: CPT | Performed by: INTERNAL MEDICINE

## 2024-12-23 PROCEDURE — 93313 ECHO TRANSESOPHAGEAL: CPT | Performed by: INTERNAL MEDICINE

## 2024-12-23 PROCEDURE — 6370000000 HC RX 637 (ALT 250 FOR IP): Performed by: INTERNAL MEDICINE

## 2024-12-23 PROCEDURE — 2500000003 HC RX 250 WO HCPCS: Performed by: NURSE PRACTITIONER

## 2024-12-23 PROCEDURE — 99239 HOSP IP/OBS DSCHRG MGMT >30: CPT | Performed by: INTERNAL MEDICINE

## 2024-12-23 PROCEDURE — 93313 ECHO TRANSESOPHAGEAL: CPT

## 2024-12-23 PROCEDURE — 36415 COLL VENOUS BLD VENIPUNCTURE: CPT

## 2024-12-23 PROCEDURE — 85520 HEPARIN ASSAY: CPT

## 2024-12-23 PROCEDURE — 6360000002 HC RX W HCPCS: Performed by: INTERNAL MEDICINE

## 2024-12-23 PROCEDURE — 80048 BASIC METABOLIC PNL TOTAL CA: CPT

## 2024-12-23 RX ORDER — LIDOCAINE HYDROCHLORIDE 20 MG/ML
SOLUTION OROPHARYNGEAL PRN
Status: COMPLETED | OUTPATIENT
Start: 2024-12-23 | End: 2024-12-23

## 2024-12-23 RX ORDER — FLECAINIDE ACETATE 50 MG/1
50 TABLET ORAL EVERY 12 HOURS
Qty: 60 TABLET | Refills: 3 | Status: SHIPPED | OUTPATIENT
Start: 2024-12-23

## 2024-12-23 RX ORDER — FLECAINIDE ACETATE 50 MG/1
50 TABLET ORAL EVERY 12 HOURS
Status: DISCONTINUED | OUTPATIENT
Start: 2024-12-23 | End: 2024-12-23

## 2024-12-23 RX ORDER — METOPROLOL SUCCINATE 25 MG/1
25 TABLET, EXTENDED RELEASE ORAL DAILY
Qty: 30 TABLET | Refills: 3 | Status: SHIPPED | OUTPATIENT
Start: 2024-12-24

## 2024-12-23 RX ORDER — FLECAINIDE ACETATE 50 MG/1
50 TABLET ORAL EVERY 12 HOURS
Status: DISCONTINUED | OUTPATIENT
Start: 2024-12-23 | End: 2024-12-23 | Stop reason: HOSPADM

## 2024-12-23 RX ORDER — METOPROLOL SUCCINATE 25 MG/1
25 TABLET, EXTENDED RELEASE ORAL DAILY
Status: DISCONTINUED | OUTPATIENT
Start: 2024-12-23 | End: 2024-12-23 | Stop reason: HOSPADM

## 2024-12-23 RX ADMIN — PROPOFOL 60 MG: 10 INJECTION, EMULSION INTRAVENOUS at 10:40

## 2024-12-23 RX ADMIN — PROPOFOL 30 MG: 10 INJECTION, EMULSION INTRAVENOUS at 10:52

## 2024-12-23 RX ADMIN — METOPROLOL SUCCINATE 25 MG: 25 TABLET, EXTENDED RELEASE ORAL at 12:39

## 2024-12-23 RX ADMIN — FLECAINIDE ACETATE 50 MG: 50 TABLET ORAL at 14:29

## 2024-12-23 RX ADMIN — LIDOCAINE HYDROCHLORIDE 10 ML: 20 SOLUTION ORAL; TOPICAL at 10:40

## 2024-12-23 RX ADMIN — RIVAROXABAN 20 MG: 20 TABLET, FILM COATED ORAL at 14:29

## 2024-12-23 RX ADMIN — BENZOCAINE, BUTAMBEN, AND TETRACAINE HYDROCHLORIDE 3 SPRAY: .028; .004; .004 AEROSOL, SPRAY TOPICAL at 10:40

## 2024-12-23 RX ADMIN — PROPOFOL 30 MG: 10 INJECTION, EMULSION INTRAVENOUS at 10:44

## 2024-12-23 RX ADMIN — SODIUM CHLORIDE, PRESERVATIVE FREE 10 ML: 5 INJECTION INTRAVENOUS at 08:41

## 2024-12-23 ASSESSMENT — ENCOUNTER SYMPTOMS
VOMITING: 0
COUGH: 0
SHORTNESS OF BREATH: 0
CHEST TIGHTNESS: 1
NAUSEA: 0
ABDOMINAL PAIN: 0

## 2024-12-23 ASSESSMENT — PAIN SCALES - GENERAL: PAINLEVEL_OUTOF10: 0

## 2024-12-23 NOTE — PROGRESS NOTES
TRANSFER - OUT REPORT:    Verbal report given to Car RN Evaristo on Chin Vergara being transferred to Car 2 for routine progression of patient care       Report consisted of patient's Situation, Background, Assessment and   Recommendations(SBAR).     Information from the following report(s) Nurse Handoff Report was reviewed with the receiving nurse.    Opportunity for questions and clarification was provided.      Patient transported with:   Registered Nurse

## 2024-12-23 NOTE — DISCHARGE SUMMARY
[facility]  Office: [phone]  Bennie Lopez DO, Dangelo Thoams DO, Bari Padilla DO, Javier Lea DO, Nannette Plummer MD, Samantha Garay MD, Mera Nguyễn MD, Mayelin Edwards MD,  Ran Last MD, Ko Potter MD, Alana Keita MD,  Masha Hartmann DO, Annabel Agrawal MD, Geovanny Parsons MD, Sreedhar Lopez DO, Magali Beavers MD,  Panfilo Melo DO, Chandni Miller MD, Celsa Hogan MD, Radha Ngo MD, Damaris Quintanilla MD,  Mario Taylor MD, Sage White MD, Caitie Perry MD, Adelaida Rubio MD, Barry Washington MD, Rachna Lawton MD, Odell Mccormick DO, He Ledesma MD, Shirley Waterhouse, CNP,  Fanny Altamirano, CNP, Odell Gomez, CNP,  Taty Valdez, ANSHU, Gloria Ayala, CNP, Adriana Rubin, CNP, Yvrose Siddiqi, CNP, Vivienne Conner, CNP, Chata Juarez PA-C, Jennifer Cohen PA-C, Ananya Smith, CNP, Juan M Lopez, CNP,  Mera Jones, CNP, Emily Hernandez, CNP,  Temitope Carreon, CNP, Kenisha Vieira, CNP           IN-PATIENT SERVICE  [facility]    Discharge Summary    Patient ID: Chin Vergara  :  1965   MRN: [id number]     ACCOUNT:  [id number]   Patient's PCP: Hitesh Ybarra MD  Admit Date: 2024   Discharge Date: 2024    Discharge Physician: Dangelo Thomas DO     The patient was seen and examined on day of discharge and this discharge summary is in conjunction with any daily progress note from day of discharge.    Active Discharge Diagnoses:     Primary Problem  Atrial flutter (HCC)      Hospital Problems  Active Hospital Problems    Diagnosis Date Noted    Chronic anticoagulation: Eliquis for atrial flutter [Z79.01] 2024    Primary hypertension [I10] 2024    H/O cardiac radiofrequency ablation [Z98.890] 2024     Exercise-induced asthma [J45.990] 07/14/2021    Atrial flutter (HCC) [I48.92] 07/13/2021         Hospital Course:     Brief History  As documented in the medical record:  \"Chin Vergara is a 59 y.o.  male pmh paflutter s/p DCCV and radiofrequency cardioversion, OA of knees who presents with Palpitations  and is admitted to the hospital for the management of A-flutter with RVR.\"     The intitial assessment and plan included:  \"    Atrial flutter (HCC) 12/21/2024 Yes     Primary hypertension 12/21/2024 Yes     H/O cardiac radiofrequency ablation 12/21/2024 Yes      Plan:   Patient status inpatient in the Progressive Unit/Step down   Paroxysmal a flutter--patient stopped Eliquis due to perceived side effects.               Heparin drip per A-fib protocol              Cardizem for rate control              Observe on telemetry              Cardiology evaluation tomorrow for further plan   2.  History of hypertension-> elevated in ED--> observe on Cardizem drip   3.  History of significant alcohol use-> observe for withdrawal symptoms \"      Database has included:  Echo 8/2024:    Left Ventricle: Low normal left ventricular systolic function. EF by 2D Simpsons Biplane is 52%. Left ventricle size is normal. Mildly increased wall thickness. Mild hypokinesis of the following segments: mid anterolateral. Normal diastolic function.    Aortic Valve: Trileaflet valve.    Left Atrium: Left atrium is mildly dilated.    Right Atrium: Right atrium is mildly dilated.    Aorta: Mildly dilated aortic root. Ao root diameter is 3.9 cm.    Image quality is good.     3/2024:  Cardioversion  Procedure done is radiofrequency ablation of the right atrial isthmus, 3D mapping using Carto, intracardiac echo was used.      7/2021:  1. A DIANNA was performed without complications.  2. Normal LV size with normal systolic function. Estimated LVEF 55%  3. No thrombus or valvular vegetation identified       Latest Reference Range & Units Most

## 2024-12-23 NOTE — PLAN OF CARE
Unsuccessful attempt at DIANNA due to inability to pass the probe despite on propofol. Will start anticoagulation with xarelto. Start flecainide and  toprol xl. Repeat evaluation in three weeks. If remains in flutter, will proceed with cardioversion. No objection to dc home later today if HR remains stable

## 2024-12-23 NOTE — PROGRESS NOTES
[facility]  Office: [phone]  Bennie Lopez DO, Dangelo Thomas DO, Bari Padilla DO, Javier Lea DO, Nannette Plummer MD, Samantha Garay MD, Mera Nguyễn MD, Mayelin Edwards MD,  Ran Last MD, Ko Potter MD, Alana Keita MD,  Masha Hartmann DO, Annabel Agrawal MD, Geovanny Parsosn MD, Sreedhar Lopez DO, Magali Beavers MD,  Panfilo Melo DO, Chandni Miller MD, Celsa Hogan MD, Radha Ngo MD, Damaris Quintanilla MD,  Mario Taylor MD, Sage White MD, Caitie Perry MD, Adelaida Rubio MD, Barry Washington MD, Rachna Lawton MD, Odell Mccormick DO, He Ledesma MD, Shirley Waterhouse, CNP,  Fanny Altamirano CNP, Odell Gomez, CNP,  Taty Valdez, ANSHU, Gloria Ayala, CNP, Adriana Rubin, CNP, Yvrose Siddiqi, CNP, Vivienne Conner, CNP, Chata Juarez PA-C, HUGH AmadorC, Ananya Smith, CNP, Juan M Lopez, CNP,  Mera Jones, CNP, Emily Hernandez, CNP,  Temitope Carreon, CNP, Kenisha Vieira, CNP         IN-PATIENT SERVICE  [facility]    Progress Note    12/23/2024    8:41 AM    Name:   Chin Vergara  MRN:     [id number]     Acct:      [id number]   Room:   2026/2026-01  IP Day:  2  Admit Date:  12/21/2024  6:43 PM    PCP:   Hitesh Ybarra MD  Code Status:  Full Code    Subjective:     C/C:   Chief Complaint   Patient presents with    Palpitations     Interval History Status:   N.p.o.  On-call for DIANNA/cardioversion  No chest pain  No palpitations  No shortness of breath    Data Base Updates:  Telemetry reveals atrial flutter: Rapid    Troponin, High [id number]    EKG:  Atrial flutter with 4:1 A-V conduction   ST elevation consider inferior injury or acute infarct   ** ** ACUTE MI / STEMI ** **   Abnormal ECG   When compared with ECG of 22-DEC-2024 04:49,   No significant change was found    Brief History:     As documented in the medical record:  \"Chin Vergara is a 59 y.o.  male pmh paflutter s/p DCCV and radiofrequency cardioversion, OA  PORTABLE    Result Date: 12/21/2024  No acute cardiopulmonary abnormality.       Assessment:        Primary Problem  Atrial flutter (HCC)    Active Hospital Problems    Diagnosis Date Noted    Chronic anticoagulation: Eliquis for atrial flutter [Z79.01] 12/22/2024    Primary hypertension [I10] 12/21/2024    H/O cardiac radiofrequency ablation [Z98.890] 12/21/2024    Exercise-induced asthma [J45.990] 07/14/2021    Atrial flutter (HCC) [I48.92] 07/13/2021         Plan:        N.p.o. for DIANNA/cardioversion  Rate control - atrial flutter  Cardizem drip  Heparin drip  Eliquis on hold  Trend EKG and troponins  Aspirin  Cardiology / EP eval  Alcohol abstinence abstinence  Blood Pressure - Monitor and control  Respiratory Therapy and Bronchodilators prn   DVT prophylaxis    IP CONSULT TO CARDIOLOGY  IP CONSULT TO HOSPITALIST    Dangelo Thomas DO  12/23/2024  8:41 AM

## 2024-12-23 NOTE — PLAN OF CARE
Problem: Discharge Planning  Goal: Discharge to home or other facility with appropriate resources  Outcome: Progressing  Flowsheets (Taken 12/22/2024 1815 by Milligan, Matthew, RN)  Discharge to home or other facility with appropriate resources:   Identify barriers to discharge with patient and caregiver   Identify discharge learning needs (meds, wound care, etc)   Refer to discharge planning if patient needs post-hospital services based on physician order or complex needs related to functional status, cognitive ability or social support system   Arrange for needed discharge resources and transportation as appropriate     Problem: Safety - Adult  Goal: Free from fall injury  Outcome: Progressing  Flowsheets (Taken 12/22/2024 1815 by Milligan, Matthew, RN)  Free From Fall Injury: Instruct family/caregiver on patient safety     Problem: ABCDS Injury Assessment  Goal: Absence of physical injury  Outcome: Progressing  Flowsheets (Taken 12/22/2024 1815 by Milligan, Matthew, RN)  Absence of Physical Injury: Implement safety measures based on patient assessment     Problem: Cardiovascular - Adult  Goal: Maintains optimal cardiac output and hemodynamic stability  Outcome: Progressing  Flowsheets (Taken 12/22/2024 1815 by Milligan, Matthew, RN)  Maintains optimal cardiac output and hemodynamic stability: Monitor blood pressure and heart rate  Goal: Absence of cardiac dysrhythmias or at baseline  Outcome: Progressing  Flowsheets (Taken 12/22/2024 1815 by Milligan, Matthew, RN)  Absence of cardiac dysrhythmias or at baseline: Monitor cardiac rate and rhythm

## 2024-12-24 LAB
EKG ATRIAL RATE: 276 BPM
EKG ATRIAL RATE: 277 BPM
EKG ATRIAL RATE: 284 BPM
EKG P AXIS: -113 DEGREES
EKG P AXIS: 81 DEGREES
EKG P AXIS: 87 DEGREES
EKG Q-T INTERVAL: 266 MS
EKG Q-T INTERVAL: 418 MS
EKG Q-T INTERVAL: 424 MS
EKG QRS DURATION: 104 MS
EKG QRS DURATION: 78 MS
EKG QRS DURATION: 94 MS
EKG QTC CALCULATION (BAZETT): 388 MS
EKG QTC CALCULATION (BAZETT): 454 MS
EKG QTC CALCULATION (BAZETT): 454 MS
EKG R AXIS: 34 DEGREES
EKG R AXIS: 41 DEGREES
EKG R AXIS: 52 DEGREES
EKG T AXIS: -10 DEGREES
EKG T AXIS: 56 DEGREES
EKG T AXIS: 57 DEGREES
EKG VENTRICULAR RATE: 128 BPM
EKG VENTRICULAR RATE: 69 BPM
EKG VENTRICULAR RATE: 71 BPM

## 2024-12-24 PROCEDURE — 93010 ELECTROCARDIOGRAM REPORT: CPT | Performed by: INTERNAL MEDICINE

## 2024-12-25 LAB — ECHO BSA: 2.17 M2

## 2024-12-26 ENCOUNTER — TELEPHONE (OUTPATIENT)
Age: 59
End: 2024-12-26

## 2024-12-26 NOTE — TELEPHONE ENCOUNTER
Patient is unsure if Dr. Gaytan want him to schedule for a hospital follow up appointment or schedule for other procedure to be done.     Discharge 12/21/24-12/23/24 [facility]    Phone [phone]

## 2025-01-02 ENCOUNTER — TELEPHONE (OUTPATIENT)
Age: 60
End: 2025-01-02

## 2025-01-02 NOTE — TELEPHONE ENCOUNTER
Called patient to follow up with note about needing a sooner hospital fup appt from DOS 12/23/24. Patient stated he \"would rather be seen sooner or later so he can get shocked if its needed\"    Was able to move appt up from 2/7/25 at [place] office to 1/23/25 @ 3:45p with Shanti OMALLEY for follow up to discuss atrial flutter issues. Cancelled 2/7/25 appt in [place] office.     Patient voiced understanding.

## 2025-01-23 ENCOUNTER — OFFICE VISIT (OUTPATIENT)
Age: 60
End: 2025-01-23
Payer: COMMERCIAL

## 2025-01-23 VITALS
SYSTOLIC BLOOD PRESSURE: 119 MMHG | WEIGHT: 212 LBS | BODY MASS INDEX: 28.75 KG/M2 | HEART RATE: 103 BPM | DIASTOLIC BLOOD PRESSURE: 79 MMHG | OXYGEN SATURATION: 98 %

## 2025-01-23 DIAGNOSIS — I10 PRIMARY HYPERTENSION: ICD-10-CM

## 2025-01-23 DIAGNOSIS — I48.3 TYPICAL ATRIAL FLUTTER (HCC): Primary | ICD-10-CM

## 2025-01-23 DIAGNOSIS — R00.2 PALPITATIONS: ICD-10-CM

## 2025-01-23 PROCEDURE — 3078F DIAST BP <80 MM HG: CPT

## 2025-01-23 PROCEDURE — 99214 OFFICE O/P EST MOD 30 MIN: CPT

## 2025-01-23 PROCEDURE — 3074F SYST BP LT 130 MM HG: CPT

## 2025-01-23 PROCEDURE — 93000 ELECTROCARDIOGRAM COMPLETE: CPT

## 2025-01-23 RX ORDER — AMOXICILLIN 875 MG/1
TABLET, COATED ORAL
COMMUNITY
Start: 2025-01-22

## 2025-01-23 ASSESSMENT — ENCOUNTER SYMPTOMS: SHORTNESS OF BREATH: 1

## 2025-01-23 NOTE — PROGRESS NOTES
Cardiology Office Follow up      Chin Vergara  1965  [id number]    Date: 1/23/25    CC: had concerns including Follow-Up from Hospital (hospital fu - DOS 12/23/24 - constant afib issues).    HPI:   Chin Vergara  is here for routine hospital follow up. While inpatient he had an unsuccessful attempt at DIANNA d/t inability to pass the probe. He is currently maintained on xarelto, toprol xl, and flecainide. He reports his HR has been 110-120s at home. Denies any episodes of CP, pressure. Endorses fatigue and SOB.       Past Medical:  No past medical history on file.      Past Surgical:  Past Surgical History:   Procedure Laterality Date    CARDIOVERSION  07/15/2021    CARDIOVERSION N/A 02/23/2024    DR. GU    COLONOSCOPY      EP DEVICE PROCEDURE N/A 3/12/2024    [facility] / Ablation A-flutter (no anes) / carto performed by Durga Kuhn MD at [facility] CARDIAC CATH LAB    EP STUDY/ABLATION N/A 03/12/2024    A-FLUTTER ABLATION    DR. KUHN    HYDROCELE EXCISION      KNEE SURGERY      NECK SURGERY      TRANSESOPHAGEAL ECHOCARDIOGRAM  07/15/2021    WISDOM TOOTH EXTRACTION           Family History:  No family history on file.    Social History:  Social History     Socioeconomic History    Marital status:      Spouse name: Not on file    Number of children: Not on file    Years of education: Not on file    Highest education level: Not on file   Occupational History    Not on file   Tobacco Use    Smoking status: Never    Smokeless tobacco: Never   Substance and Sexual Activity    Alcohol use: Yes     Comment: socially    Drug use: Not on file    Sexual activity: Not on file   Other Topics Concern    Not on file   Social History Narrative    Not on file     Social Determinants of Health     Financial Resource Strain: Low Risk  (1/22/2024)    Overall Financial Resource Strain (CARDIA)     Difficulty of Paying Living Expenses: Not hard at all   Food Insecurity: No Food Insecurity (12/21/2024)

## 2025-01-24 ENCOUNTER — TELEPHONE (OUTPATIENT)
Age: 60
End: 2025-01-24

## 2025-01-24 NOTE — TELEPHONE ENCOUNTER
Cardioversion scheduled 1/29/25 ar [place]  with Dr Gaytan.  Patient was given verbal instructions via phone.  Patient verbalized understanding

## 2025-01-28 ENCOUNTER — TELEPHONE (OUTPATIENT)
Age: 60
End: 2025-01-28

## 2025-01-28 NOTE — TELEPHONE ENCOUNTER
Pre-procedure phone call complete. Pt to arrive to Ent C with  at 0800. NPO after midnight. No missed doses of Xarelto in last 30 days.

## 2025-01-29 ENCOUNTER — HOSPITAL ENCOUNTER (OUTPATIENT)
Age: 60
Setting detail: OUTPATIENT SURGERY
Discharge: HOME | End: 2025-01-31
Attending: INTERNAL MEDICINE
Payer: COMMERCIAL

## 2025-01-29 ENCOUNTER — HOSPITAL ENCOUNTER (OUTPATIENT)
Age: 60
Discharge: HOME | End: 2025-01-31
Attending: INTERNAL MEDICINE

## 2025-01-29 VITALS
OXYGEN SATURATION: 95 % | SYSTOLIC BLOOD PRESSURE: 117 MMHG | TEMPERATURE: 97.5 F | HEIGHT: 72 IN | DIASTOLIC BLOOD PRESSURE: 82 MMHG | HEART RATE: 74 BPM | WEIGHT: 209.8 LBS | BODY MASS INDEX: 28.42 KG/M2 | RESPIRATION RATE: 22 BRPM

## 2025-01-29 DIAGNOSIS — I48.92 ATRIAL FLUTTER, UNSPECIFIED TYPE (HCC): ICD-10-CM

## 2025-01-29 LAB
BUN BLD-MCNC: 13 MG/DL (ref 8–26)
CA-I BLD-SCNC: 1.29 MMOL/L (ref 1.15–1.33)
CHLORIDE BLD-SCNC: 105 MMOL/L (ref 98–107)
CO2 BLD CALC-SCNC: 28 MMOL/L (ref 22–30)
ECHO BSA: 2.2 M2
ECHO BSA: 2.2 M2
EGFR, POC: >90 ML/MIN/1.73M2
EKG ATRIAL RATE: 208 BPM
EKG P AXIS: 74 DEGREES
EKG Q-T INTERVAL: 382 MS
EKG QRS DURATION: 102 MS
EKG QTC CALCULATION (BAZETT): 435 MS
EKG R AXIS: 10 DEGREES
EKG T AXIS: 11 DEGREES
EKG VENTRICULAR RATE: 78 BPM
GLUCOSE BLD-MCNC: 94 MG/DL (ref 74–100)
HCT VFR BLD AUTO: 43 % (ref 41–53)
POC ANION GAP: 13 MMOL/L (ref 7–16)
POC CREATININE: 0.9 MG/DL (ref 0.51–1.19)
POC HEMOGLOBIN (CALC): 14.5 G/DL (ref 13.5–17.5)
POTASSIUM BLD-SCNC: 4.3 MMOL/L (ref 3.5–4.5)
SODIUM BLD-SCNC: 145 MMOL/L (ref 138–146)

## 2025-01-29 PROCEDURE — 82565 ASSAY OF CREATININE: CPT

## 2025-01-29 PROCEDURE — 2500000003 HC RX 250 WO HCPCS: Performed by: INTERNAL MEDICINE

## 2025-01-29 PROCEDURE — 93005 ELECTROCARDIOGRAM TRACING: CPT | Performed by: INTERNAL MEDICINE

## 2025-01-29 PROCEDURE — 82947 ASSAY GLUCOSE BLOOD QUANT: CPT

## 2025-01-29 PROCEDURE — 80051 ELECTROLYTE PANEL: CPT

## 2025-01-29 PROCEDURE — 85014 HEMATOCRIT: CPT

## 2025-01-29 PROCEDURE — 92960 CARDIOVERSION ELECTRIC EXT: CPT

## 2025-01-29 PROCEDURE — 6360000002 HC RX W HCPCS: Performed by: INTERNAL MEDICINE

## 2025-01-29 PROCEDURE — 84520 ASSAY OF UREA NITROGEN: CPT

## 2025-01-29 PROCEDURE — 7100000010 HC PHASE II RECOVERY - FIRST 15 MIN

## 2025-01-29 PROCEDURE — 82330 ASSAY OF CALCIUM: CPT

## 2025-01-29 PROCEDURE — 93010 ELECTROCARDIOGRAM REPORT: CPT | Performed by: INTERNAL MEDICINE

## 2025-01-29 PROCEDURE — 7100000011 HC PHASE II RECOVERY - ADDTL 15 MIN

## 2025-01-29 RX ORDER — SODIUM CHLORIDE 0.9 % (FLUSH) 0.9 %
5-40 SYRINGE (ML) INJECTION PRN
Status: DISCONTINUED | OUTPATIENT
Start: 2025-01-29 | End: 2025-02-01 | Stop reason: HOSPADM

## 2025-01-29 RX ORDER — SODIUM CHLORIDE 0.9 % (FLUSH) 0.9 %
5-40 SYRINGE (ML) INJECTION EVERY 12 HOURS SCHEDULED
Status: DISCONTINUED | OUTPATIENT
Start: 2025-01-29 | End: 2025-02-01 | Stop reason: HOSPADM

## 2025-01-29 RX ORDER — SODIUM CHLORIDE 9 MG/ML
INJECTION, SOLUTION INTRAVENOUS PRN
Status: DISCONTINUED | OUTPATIENT
Start: 2025-01-29 | End: 2025-02-01 | Stop reason: HOSPADM

## 2025-01-29 RX ADMIN — PROPOFOL 50 MG: 10 INJECTION, EMULSION INTRAVENOUS at 09:32

## 2025-01-29 RX ADMIN — SODIUM CHLORIDE, PRESERVATIVE FREE 10 ML: 5 INJECTION INTRAVENOUS at 08:41

## 2025-01-29 NOTE — H&P
Cardiac Catheterization Lab          Date:   1/29/2025  Patient name: Chin Vergara  Date of admission:  1/29/2025  8:01 AM  MRN:   [id number]  YOB: 1965    Reason for Admission: Elective cardioversion     CHIEF COMPLAINT:  59 y.o. y/o male presented with symptomatic atrial flutter     [X] Please see our office consult note for detailed H and P in the chart    [X]  No changes in the information noted from the time of evaluation    Past Medical History:   has a past medical history of Atrial flutter (HCC) and H/O ETOH abuse.    Past Surgical History:   has a past surgical history that includes Neck surgery; knee surgery; Hydrocele surgery; [place] tooth extraction; Colonoscopy; transesophageal echocardiogram (07/15/2021); Cardioversion (07/15/2021); Cardioversion (N/A, 02/23/2024); ep study/ablation (N/A, 03/12/2024); and ep device procedure (N/A, 3/12/2024).     Home Medications:    Prior to Admission medications    Medication Sig Start Date End Date Taking? Authorizing Provider   amoxicillin (AMOXIL) 875 MG tablet  1/22/25  Yes Rajan Huddleston MD   flecainide (TAMBOCOR) 50 MG tablet Take 1 tablet by mouth in the morning and 1 tablet in the evening. 12/23/24  Yes Dangelo Thomas DO   metoprolol succinate (TOPROL XL) 25 MG extended release tablet Take 1 tablet by mouth daily 12/24/24  Yes Dangelo Thomas DO   rivaroxaban (XARELTO) 20 MG TABS tablet Take 1 tablet by mouth daily 12/23/24  Yes Dangelo Thomas DO   glucosamine-chondroitin 500-400 MG tablet Take 1 tablet by mouth daily   Yes Rajan Huddleston MD   Multiple Vitamins-Minerals (THERAPEUTIC MULTIVITAMIN-MINERALS) tablet Take 1 tablet by mouth daily   Yes Rajan Huddleston MD       Allergies:  Eliquis [apixaban] and Levaquin [levofloxacin in d5w]    Social History:   reports that he has never smoked. He has

## 2025-01-29 NOTE — PROGRESS NOTES
TRANSFER - OUT REPORT:    Verbal report given to mason(name) on Chin Vergara being transferred to [facility](unit) for routine post-op       Report consisted of patient's Situation, Background, Assessment and   Recommendations(SBAR).     Information from the following report(s) Nurse Handoff Report was reviewed with the receiving nurse.    Opportunity for questions and clarification was provided.      Patient transported with:   Registered Nurse

## 2025-02-01 ENCOUNTER — APPOINTMENT (OUTPATIENT)
Dept: GENERAL RADIOLOGY | Age: 60
End: 2025-02-01
Payer: COMMERCIAL

## 2025-02-01 ENCOUNTER — HOSPITAL ENCOUNTER (EMERGENCY)
Age: 60
Discharge: HOME | End: 2025-02-01
Attending: STUDENT IN AN ORGANIZED HEALTH CARE EDUCATION/TRAINING PROGRAM
Payer: COMMERCIAL

## 2025-02-01 VITALS
TEMPERATURE: 98.1 F | RESPIRATION RATE: 22 BRPM | DIASTOLIC BLOOD PRESSURE: 80 MMHG | HEART RATE: 85 BPM | SYSTOLIC BLOOD PRESSURE: 168 MMHG | OXYGEN SATURATION: 96 %

## 2025-02-01 DIAGNOSIS — J06.9 VIRAL UPPER RESPIRATORY TRACT INFECTION: Primary | ICD-10-CM

## 2025-02-01 LAB
ALBUMIN SERPL-MCNC: 4.8 G/DL (ref 3.5–5.2)
ALBUMIN/GLOB SERPL: 2.1 {RATIO} (ref 1–2.5)
ALP SERPL-CCNC: 55 U/L (ref 40–129)
ALT SERPL-CCNC: 26 U/L (ref 10–50)
ANION GAP SERPL CALCULATED.3IONS-SCNC: 12 MMOL/L (ref 9–16)
AST SERPL-CCNC: 25 U/L (ref 10–50)
BASOPHILS # BLD: <0.03 K/UL (ref 0–0.2)
BASOPHILS NFR BLD: 0 % (ref 0–2)
BILIRUB SERPL-MCNC: 0.8 MG/DL (ref 0–1.2)
BUN SERPL-MCNC: 14 MG/DL (ref 8–23)
CALCIUM SERPL-MCNC: 9.7 MG/DL (ref 8.6–10.4)
CHLORIDE SERPL-SCNC: 107 MMOL/L (ref 98–107)
CO2 SERPL-SCNC: 23 MMOL/L (ref 20–31)
CREAT SERPL-MCNC: 0.9 MG/DL (ref 0.7–1.2)
EOSINOPHIL # BLD: <0.03 K/UL (ref 0–0.44)
EOSINOPHILS RELATIVE PERCENT: 0 % (ref 1–4)
ERYTHROCYTE [DISTWIDTH] IN BLOOD BY AUTOMATED COUNT: 12.6 % (ref 11.8–14.4)
FLUAV AG SPEC QL: NEGATIVE
FLUBV AG SPEC QL: NEGATIVE
GFR, ESTIMATED: >90 ML/MIN/1.73M2
GLUCOSE SERPL-MCNC: 120 MG/DL (ref 74–99)
HCT VFR BLD AUTO: 40.8 % (ref 40.7–50.3)
HGB BLD-MCNC: 13.7 G/DL (ref 13–17)
IMM GRANULOCYTES # BLD AUTO: <0.03 K/UL (ref 0–0.3)
IMM GRANULOCYTES NFR BLD: 0 %
LIPASE SERPL-CCNC: 35 U/L (ref 13–60)
LYMPHOCYTES NFR BLD: 0.83 K/UL (ref 1.1–3.7)
LYMPHOCYTES RELATIVE PERCENT: 14 % (ref 24–43)
MCH RBC QN AUTO: 31.2 PG (ref 25.2–33.5)
MCHC RBC AUTO-ENTMCNC: 33.6 G/DL (ref 28.4–34.8)
MCV RBC AUTO: 92.9 FL (ref 82.6–102.9)
MONOCYTES NFR BLD: 0.35 K/UL (ref 0.1–1.2)
MONOCYTES NFR BLD: 6 % (ref 3–12)
NEUTROPHILS NFR BLD: 80 % (ref 36–65)
NEUTS SEG NFR BLD: 4.67 K/UL (ref 1.5–8.1)
NRBC BLD-RTO: 0 PER 100 WBC
PLATELET # BLD AUTO: 264 K/UL (ref 138–453)
PMV BLD AUTO: 11.3 FL (ref 8.1–13.5)
POTASSIUM SERPL-SCNC: 3.9 MMOL/L (ref 3.7–5.3)
PROT SERPL-MCNC: 7.1 G/DL (ref 6.6–8.7)
RBC # BLD AUTO: 4.39 M/UL (ref 4.21–5.77)
SARS-COV-2 RDRP RESP QL NAA+PROBE: NOT DETECTED
SODIUM SERPL-SCNC: 142 MMOL/L (ref 136–145)
SPECIMEN DESCRIPTION: NORMAL
TROPONIN I SERPL HS-MCNC: <6 NG/L (ref 0–22)
WBC OTHER # BLD: 5.9 K/UL (ref 3.5–11.3)

## 2025-02-01 PROCEDURE — 87635 SARS-COV-2 COVID-19 AMP PRB: CPT

## 2025-02-01 PROCEDURE — 96375 TX/PRO/DX INJ NEW DRUG ADDON: CPT

## 2025-02-01 PROCEDURE — 99285 EMERGENCY DEPT VISIT HI MDM: CPT

## 2025-02-01 PROCEDURE — 87804 INFLUENZA ASSAY W/OPTIC: CPT

## 2025-02-01 PROCEDURE — 96374 THER/PROPH/DIAG INJ IV PUSH: CPT

## 2025-02-01 PROCEDURE — 85025 COMPLETE CBC W/AUTO DIFF WBC: CPT

## 2025-02-01 PROCEDURE — 93005 ELECTROCARDIOGRAM TRACING: CPT | Performed by: STUDENT IN AN ORGANIZED HEALTH CARE EDUCATION/TRAINING PROGRAM

## 2025-02-01 PROCEDURE — 71046 X-RAY EXAM CHEST 2 VIEWS: CPT

## 2025-02-01 PROCEDURE — 83690 ASSAY OF LIPASE: CPT

## 2025-02-01 PROCEDURE — 6360000002 HC RX W HCPCS: Performed by: STUDENT IN AN ORGANIZED HEALTH CARE EDUCATION/TRAINING PROGRAM

## 2025-02-01 PROCEDURE — 84484 ASSAY OF TROPONIN QUANT: CPT

## 2025-02-01 PROCEDURE — 80053 COMPREHEN METABOLIC PANEL: CPT

## 2025-02-01 RX ORDER — PROCHLORPERAZINE EDISYLATE 5 MG/ML
10 INJECTION INTRAMUSCULAR; INTRAVENOUS ONCE
Status: COMPLETED | OUTPATIENT
Start: 2025-02-01 | End: 2025-02-01

## 2025-02-01 RX ORDER — DIPHENHYDRAMINE HYDROCHLORIDE 50 MG/ML
25 INJECTION INTRAMUSCULAR; INTRAVENOUS ONCE
Status: COMPLETED | OUTPATIENT
Start: 2025-02-01 | End: 2025-02-01

## 2025-02-01 RX ORDER — ONDANSETRON 2 MG/ML
4 INJECTION INTRAMUSCULAR; INTRAVENOUS ONCE
Status: DISCONTINUED | OUTPATIENT
Start: 2025-02-01 | End: 2025-02-01

## 2025-02-01 RX ORDER — ONDANSETRON 4 MG/1
4 TABLET, ORALLY DISINTEGRATING ORAL EVERY 8 HOURS PRN
Qty: 10 TABLET | Refills: 0 | Status: SHIPPED | OUTPATIENT
Start: 2025-02-01

## 2025-02-01 RX ADMIN — PROCHLORPERAZINE EDISYLATE 10 MG: 5 INJECTION INTRAMUSCULAR; INTRAVENOUS at 12:37

## 2025-02-01 RX ADMIN — DIPHENHYDRAMINE HYDROCHLORIDE 25 MG: 50 INJECTION INTRAMUSCULAR; INTRAVENOUS at 12:37

## 2025-02-01 ASSESSMENT — ENCOUNTER SYMPTOMS
SORE THROAT: 0
CONSTIPATION: 0
SHORTNESS OF BREATH: 0
COUGH: 0
RHINORRHEA: 1
DIARRHEA: 0
ABDOMINAL PAIN: 0

## 2025-02-01 NOTE — ED NOTES
Pt to ED via triage with complaints of runny nose, emesis, headache, and chest tightness all beginning last night at 0230. Pt states he had a ablation for his heart Wednesday and was told to be seen if symptoms like this occurred. Pt vitals are stable at this time, pt a&o x4. Pt states he is compliant with medications and his only major medical hx is a-fib. Pt is on monitor, IV placed, and call light within reach. No other needs stated.

## 2025-02-01 NOTE — ED PROVIDER NOTES
[facility]     Emergency Department     Faculty Attestation    I performed a history and physical examination of the patient and discussed management with the resident. I have reviewed and agree with the resident’s findings including all diagnostic interpretations, and treatment plans as written at the time of my review. Any areas of disagreement are noted on the chart. I was personally present for the key portions of any procedures. I have documented in the chart those procedures where I was not present during the key portions. For Physician Assistant/ Nurse Practitioner cases/documentation I have personally evaluated this patient and have completed at least one if not all key elements of the E/M (history, physical exam, and MDM). Additional findings are as noted.    PtName: Chin Vergara  MRN: [id number]  Birthdate 1965  Date of evaluation: 2/1/25  Note Started: 11:58 AM EST    Primary Care Physician: Hitesh Ybarra MD    Brief HPI:  59-year-old male presents to the emergency department with headache, congestion, body aches, nausea, vomiting, chest tightness.  The patient reports that symptoms started yesterday into this morning.  The patient has history of a flutter status post recent cardioversion and therefore presented to the emergency department.     Pertinent Physical Exam Findings:  Vitals:    02/01/25 1152   BP: (!) 141/88   Pulse: 63   Resp: 16   Temp: 98.1 °F (36.7 °C)   SpO2: 97%   Appears well, resting comfortably, no acute distress, GCS is 15, regular rate and rhythm, respirations are even and unlabored.    Medical Decision Making: Patient is a 59 y.o. male presenting to the emergency department with headache, congestion, body aches, nausea, vomiting, chest tightness. The chart was reviewed for pertinent history relating to the chief complaint.  The patient appears well at this time, no acute distress, vitals are stable.  Initial twelve-lead EKG

## 2025-02-01 NOTE — ED PROVIDER NOTES
[facility] EMERGENCY DEPARTMENT  Emergency Department Encounter  Emergency Medicine Resident     Pt Name:Chin Vergara  MRN: [id number]  Birthdate 1965  Date of evaluation: 2/1/25  PCP:  Hitesh Ybarra MD  Note Started: 12:14 PM EST    CHIEF COMPLAINT       Chief Complaint   Patient presents with    Headache    Nasal Congestion     With running nose-clear    Vomiting    Chest Pain     Tightness       HISTORY OF PRESENT ILLNESS  (Location/Symptom, Timing/Onset, Context/Setting, Quality, Duration, Modifying Factors, Severity.)      Chin Vergara is a 59 y.o. male who presents with PMH of A-fib and a flutter with recent heart cath this last Wednesday 1/29/2025, he presents to the ED after developing headache, nausea, vomiting, pleuritic chest pain this morning at 2:30 AM, patient states he vomited 5 times most recently on the way to the ED. He states that after his heart cath he was told to go to the ED if he develops any such symptoms and that is why he came in today. Patient states he has been on amoxicillin for the for the flu and completed his last dose yesterday.  Patient also states he was recently at a water park and may have been around some sick contacts.  Patient denies any fever, chills, abdominal pain, diarrhea, constipation, shortness of breath, cough.    PAST MEDICAL / SURGICAL / SOCIAL / FAMILY HISTORY      has a past medical history of Atrial flutter (HCC) and H/O ETOH abuse.     has a past surgical history that includes Neck surgery; knee surgery; Hydrocele surgery; [place] tooth extraction; Colonoscopy; transesophageal echocardiogram (07/15/2021); Cardioversion (07/15/2021); Cardioversion (N/A, 02/23/2024); ep study/ablation (N/A, 03/12/2024); and ep device procedure (N/A, 3/12/2024).    Social History     Socioeconomic History    Marital status:      Spouse name: Not on file    Number of children: Not on file    Years of education: Not on file    Highest education level: Not  on file   Occupational History    Not on file   Tobacco Use    Smoking status: Never    Smokeless tobacco: Never   Substance and Sexual Activity    Alcohol use: Yes     Comment: socially    Drug use: Never    Sexual activity: Not on file   Other Topics Concern    Not on file   Social History Narrative    Not on file     Social Determinants of Health     Financial Resource Strain: Low Risk  (1/22/2024)    Overall Financial Resource Strain (CARDIA)     Difficulty of Paying Living Expenses: Not hard at all   Food Insecurity: No Food Insecurity (12/21/2024)    Hunger Vital Sign     Worried About Running Out of Food in the Last Year: Never true     Ran Out of Food in the Last Year: Never true   Transportation Needs: No Transportation Needs (12/21/2024)    PRAPARE - Transportation     Lack of Transportation (Medical): No     Lack of Transportation (Non-Medical): No   Physical Activity: Not on file   Stress: Not on file   Social Connections: Not on file   Intimate Partner Violence: Not on file   Housing Stability: Low Risk  (12/21/2024)    Housing Stability Vital Sign     Unable to Pay for Housing in the Last Year: No     Number of Times Moved in the Last Year: 1     Homeless in the Last Year: No     History reviewed. No pertinent family history.    Allergies:  Eliquis [apixaban] and Levaquin [levofloxacin in d5w]    Home Medications:  Prior to Admission medications    Medication Sig Start Date End Date Taking? Authorizing Provider   ondansetron (ZOFRAN-ODT) 4 MG disintegrating tablet Take 1 tablet by mouth every 8 hours as needed for Nausea or Vomiting 2/1/25  Yes Gail Hebert MD   amoxicillin (AMOXIL) 875 MG tablet  1/22/25   Provider, MD Rajan   flecainide (TAMBOCOR) 50 MG tablet Take 1 tablet by mouth in the morning and 1 tablet in the evening. 12/23/24   Dangelo Thomas DO   metoprolol succinate (TOPROL XL) 25 MG extended release tablet Take 1 tablet by mouth daily 12/24/24   Dangelo Thomas DO

## 2025-02-04 LAB
EKG ATRIAL RATE: 63 BPM
EKG P AXIS: 50 DEGREES
EKG P-R INTERVAL: 194 MS
EKG Q-T INTERVAL: 448 MS
EKG QRS DURATION: 110 MS
EKG QTC CALCULATION (BAZETT): 458 MS
EKG R AXIS: 38 DEGREES
EKG T AXIS: 53 DEGREES
EKG VENTRICULAR RATE: 63 BPM

## 2025-03-06 ENCOUNTER — OFFICE VISIT (OUTPATIENT)
Age: 60
End: 2025-03-06
Payer: COMMERCIAL

## 2025-03-06 VITALS
BODY MASS INDEX: 28.34 KG/M2 | HEART RATE: 61 BPM | SYSTOLIC BLOOD PRESSURE: 124 MMHG | OXYGEN SATURATION: 100 % | DIASTOLIC BLOOD PRESSURE: 76 MMHG | WEIGHT: 209 LBS

## 2025-03-06 DIAGNOSIS — I48.92 ATRIAL FLUTTER, UNSPECIFIED TYPE (HCC): Primary | ICD-10-CM

## 2025-03-06 DIAGNOSIS — R06.83 SNORING: ICD-10-CM

## 2025-03-06 DIAGNOSIS — Z98.890 H/O CARDIAC RADIOFREQUENCY ABLATION: ICD-10-CM

## 2025-03-06 DIAGNOSIS — I10 PRIMARY HYPERTENSION: ICD-10-CM

## 2025-03-06 PROCEDURE — 3078F DIAST BP <80 MM HG: CPT

## 2025-03-06 PROCEDURE — 99214 OFFICE O/P EST MOD 30 MIN: CPT

## 2025-03-06 PROCEDURE — 3074F SYST BP LT 130 MM HG: CPT

## 2025-03-06 PROCEDURE — 93000 ELECTROCARDIOGRAM COMPLETE: CPT

## 2025-03-06 RX ORDER — WARFARIN SODIUM 5 MG/1
TABLET ORAL
Qty: 30 TABLET | Refills: 3 | Status: SHIPPED | OUTPATIENT
Start: 2025-03-06

## 2025-03-06 RX ORDER — FLECAINIDE ACETATE 50 MG/1
50 TABLET ORAL EVERY 12 HOURS
Qty: 180 TABLET | Refills: 3 | Status: SHIPPED | OUTPATIENT
Start: 2025-03-06

## 2025-03-06 RX ORDER — METOPROLOL SUCCINATE 25 MG/1
25 TABLET, EXTENDED RELEASE ORAL DAILY
Qty: 90 TABLET | Refills: 3 | Status: SHIPPED | OUTPATIENT
Start: 2025-03-06

## 2025-03-06 ASSESSMENT — ENCOUNTER SYMPTOMS
SHORTNESS OF BREATH: 0
CHEST TIGHTNESS: 0

## 2025-03-06 NOTE — PROGRESS NOTES
Cardiology Office Follow up      Chin Vergara  1965  [id number]    Date: 3/6/25    CC: had concerns including Follow-up (ardioversion DOS 1/29/25/).    HPI:   Chin Vergara  is here for routine follow up s/p cardioversion. He states he has been doing well. Denies any episodes of CP, pressure, SOB, palpitations, or syncope.     Past Medical:  Past Medical History:   Diagnosis Date    Atrial flutter (HCC)     H/O ETOH abuse          Past Surgical:  Past Surgical History:   Procedure Laterality Date    CARDIOVERSION  07/15/2021    CARDIOVERSION N/A 02/23/2024    DR. GU    COLONOSCOPY      EP DEVICE PROCEDURE N/A 3/12/2024    [facility] / Ablation A-flutter (no anes) / carto performed by Durga Kuhn MD at [facility] CARDIAC CATH LAB    EP STUDY/ABLATION N/A 03/12/2024    A-FLUTTER ABLATION    DR. KUHN    HYDROCELE EXCISION      KNEE SURGERY      NECK SURGERY      TRANSESOPHAGEAL ECHOCARDIOGRAM  07/15/2021    WISDOM TOOTH EXTRACTION           Family History:  No family history on file.    Social History:  Social History     Socioeconomic History    Marital status:      Spouse name: Not on file    Number of children: Not on file    Years of education: Not on file    Highest education level: Not on file   Occupational History    Not on file   Tobacco Use    Smoking status: Never    Smokeless tobacco: Never   Substance and Sexual Activity    Alcohol use: Yes     Comment: socially    Drug use: Never    Sexual activity: Not on file   Other Topics Concern    Not on file   Social History Narrative    Not on file     Social Determinants of Health     Financial Resource Strain: Low Risk  (1/22/2024)    Overall Financial Resource Strain (CARDIA)     Difficulty of Paying Living Expenses: Not hard at all   Food Insecurity: No Food Insecurity (12/21/2024)    Hunger Vital Sign     Worried About Running Out of Food in the Last Year: Never true     Ran Out of Food in the Last Year: Never true

## 2025-03-25 ENCOUNTER — TELEPHONE (OUTPATIENT)
Age: 60
End: 2025-03-25

## 2025-03-25 NOTE — TELEPHONE ENCOUNTER
Spoke to patient today regarding transitioning to Warfarin.  He will start taking warfarin 5 mg daily today.  Will schedule patient for initial visit on Monday (5 days after starting warfarin)

## 2025-03-31 ENCOUNTER — ANTI-COAG VISIT (OUTPATIENT)
Age: 60
End: 2025-03-31
Payer: COMMERCIAL

## 2025-03-31 DIAGNOSIS — I48.4 ATYPICAL ATRIAL FLUTTER: Primary | ICD-10-CM

## 2025-03-31 LAB
INTERNATIONAL NORMALIZATION RATIO, POC: 1.3
PROTHROMBIN TIME, POC: 15.2

## 2025-03-31 PROCEDURE — 99213 OFFICE O/P EST LOW 20 MIN: CPT

## 2025-03-31 PROCEDURE — 85610 PROTHROMBIN TIME: CPT

## 2025-03-31 NOTE — PROGRESS NOTES
Medication Management Service, Warfarin Management  [facility] Medication Management, [phone]  Visit Date: 3/31/2025   Subjective:   Chin Vergara is a 59 y.o. male who presents to clinic today for anticoagulation monitoring and adjustment. Today is patient's initial visit for warfarin management per referral from Shanti Carvalho CNP. Patient was previously on Xarelto - this was too expensive. Had issues when he tried Eliquis in the past, won't use this again.. Last dose of Xarelto was Monday 3/24. Patient started warfarin 5 mg daily 3/25 per provider. Takes all his pills in the AM so has thusfar been taking warfarin in the AM - is willing to switch to PM to allow for easier dose adjustments on days INR is checked if necessary.    Patient was referred for warfarin management due to  Indication:   atrial fibrillation.   INR goal: of 2.0-3.0.  Duration of therapy: indefinite.    Patient reports the following:   Adherent with regimen:  Yes  Missed or extra doses:  None   Bleeding or thromboembolic side effects:  None    Significant medication, dietary, alcohol, or tobacco changes:    Has spinach ~3-4 times a week, encouraged to continue  Eats a lot of green beans  Does drink some alcohol - 10 ounce cups of draft beer, about 8 across about 3 days a week at the bar  Discussed consistency in diet. Extensive first-visit warfarin counseling provided on diet and medication affect on warfarin. Reviewed all current medications.     Significant recent illness, disease state changes, or hospitalization:  None  Upcoming surgeries or procedures:  None           Assessment and PLAN   PT/INR done in office per protocol.     INR today is 1.3, subtherapeutic.    Plan:  Take increased dose of warfarin 10 mg today, 7.5 mg tomorrow 4/1, then 5 mg 4/2 and return for INR check on Thursday 4/3.  Using warfarin 5 mg tablets.    Recheck INR in 3 days.     Patient verbalized understanding of dosing directions and information

## 2025-04-01 NOTE — PROGRESS NOTES
Medication Management Service, Warfarin Management  [facility] Medication Management, [phone]  Visit Date: 4/3/2025   Subjective:   Chin Vergara is a 59 y.o. male who presents to clinic today for anticoagulation monitoring and adjustment.    Patient was referred for warfarin management due to  Indication:   atrial fibrillation.   INR goal: of 2.0-3.0.  Duration of therapy: indefinite.    Patient reports the following:   Adherent with regimen:  Yes  Missed or extra doses:  None   Bleeding or thromboembolic side effects:  None  Significant medication, dietary, alcohol, or tobacco changes:  None  Significant recent illness, disease state changes, or hospitalization:  None  Upcoming surgeries or procedures:  None           Assessment and PLAN   PT/INR done in office per protocol.     INR today is 1.4, subtherapeutic. Only very slight increase from 1.3 three days ago with boost doses given.    Plan:  Continue to increase dose. Take warfarin 7.5 mg daily until next INR check Monday.  Using warfarin 5 mg tablets.    Recheck INR in 4 days.     Patient verbalized understanding of dosing directions and information discussed. Dosing schedule given to patient. Progress note sent to referring office.  Patient acknowledges working in consult agreement with pharmacist as referred by his/her physician.      Electronically signed by Aliza Stephens RPH on 25 at 10:54 AM EDT    For Pharmacy Admin Tracking Only    Intervention Detail: Adherence Monitorin and Dose Adjustment: 1, reason: Therapy Optimization  Total # of Interventions Recommended: 2  Total # of Interventions Accepted: 2  Time Spent (min): 20

## 2025-04-03 ENCOUNTER — ANTI-COAG VISIT (OUTPATIENT)
Age: 60
End: 2025-04-03
Payer: COMMERCIAL

## 2025-04-03 DIAGNOSIS — I48.4 ATYPICAL ATRIAL FLUTTER: Primary | ICD-10-CM

## 2025-04-03 LAB
INTERNATIONAL NORMALIZATION RATIO, POC: 1.4
PROTHROMBIN TIME, POC: 17.3

## 2025-04-03 PROCEDURE — 85610 PROTHROMBIN TIME: CPT

## 2025-04-03 PROCEDURE — 99213 OFFICE O/P EST LOW 20 MIN: CPT

## 2025-04-05 NOTE — PROGRESS NOTES
Medication Management Service, Warfarin Management  [facility] Medication Management, [phone]  Visit Date: 2025   Subjective:   Chin Vergara is a 59 y.o. male who presents to clinic today for anticoagulation monitoring and adjustment.    Patient was referred for warfarin management due to  Indication:   atrial fibrillation.   INR goal: of 2.0-3.0.  Duration of therapy: indefinite.    Patient reports the following:   Adherent with regimen:  Yes  Missed or extra doses:  None   Bleeding or thromboembolic side effects:  None    Significant medication, dietary, alcohol, or tobacco changes:   lighter vegetable intake over the weekend, this will go back to baseline (more salads) this week    Significant recent illness, disease state changes, or hospitalization:  None  Upcoming surgeries or procedures:  None           Assessment and PLAN   PT/INR done in office per protocol.     INR today is 2.1, therapeutic.    Plan:  Start maintenance regimen of warfarin 5 mg on  and 7.5 mg all other days. Still establishing maintenance dose, dosing calendar updated to reflect total doses of warfarin patient has received since starting therapy 3/25/25.  Using warfarin 5 mg tablets. Depending on next INR, may change tablet strength for refills.    Recheck INR in 1 week(s).     Patient verbalized understanding of dosing directions and information discussed. Dosing schedule given to patient. Progress note sent to referring office.  Patient acknowledges working in consult agreement with pharmacist as referred by his/her physician.      Electronically signed by Aliza Stephens RPH on 25 at 12:34 PM EDT    For Pharmacy Admin Tracking Only    Intervention Detail: Adherence Monitorin and Dose Adjustment: 1, reason: Therapy Optimization  Total # of Interventions Recommended: 2  Total # of Interventions Accepted: 2  Time Spent (min): 20

## 2025-04-07 ENCOUNTER — ANTI-COAG VISIT (OUTPATIENT)
Age: 60
End: 2025-04-07
Payer: COMMERCIAL

## 2025-04-07 DIAGNOSIS — I48.4 ATYPICAL ATRIAL FLUTTER: Primary | ICD-10-CM

## 2025-04-07 LAB
INTERNATIONAL NORMALIZATION RATIO, POC: 2.1
PROTHROMBIN TIME, POC: 25

## 2025-04-07 PROCEDURE — 85610 PROTHROMBIN TIME: CPT

## 2025-04-07 PROCEDURE — 99212 OFFICE O/P EST SF 10 MIN: CPT

## 2025-04-14 ENCOUNTER — ANTI-COAG VISIT (OUTPATIENT)
Age: 60
End: 2025-04-14
Payer: COMMERCIAL

## 2025-04-14 DIAGNOSIS — I48.4 ATYPICAL ATRIAL FLUTTER: Primary | ICD-10-CM

## 2025-04-14 DIAGNOSIS — I48.92 ATRIAL FLUTTER, UNSPECIFIED TYPE (HCC): ICD-10-CM

## 2025-04-14 LAB
INTERNATIONAL NORMALIZATION RATIO, POC: 2.6
PROTHROMBIN TIME, POC: 31.1

## 2025-04-14 PROCEDURE — 99212 OFFICE O/P EST SF 10 MIN: CPT

## 2025-04-14 PROCEDURE — 85610 PROTHROMBIN TIME: CPT

## 2025-04-14 RX ORDER — WARFARIN SODIUM 7.5 MG/1
TABLET ORAL
Qty: 90 TABLET | Refills: 1 | Status: SHIPPED | OUTPATIENT
Start: 2025-04-14

## 2025-04-14 NOTE — PROGRESS NOTES
Medication Management Service, Warfarin Management  [facility] Medication Management, [phone]  Visit Date: 4/14/2025   Subjective:   Chin Vergara is a 59 y.o. male who presents to clinic today for anticoagulation monitoring and adjustment.    Patient was referred for warfarin management due to  Indication:   atrial fibrillation.   INR goal: of 2.0-3.0.  Duration of therapy: indefinite.    Patient reports the following:   Adherent with regimen:  Yes  Missed or extra doses:  None   Bleeding or thromboembolic side effects:  None  Significant medication, dietary, alcohol, or tobacco changes:  None  Significant recent illness, disease state changes, or hospitalization:  None  Upcoming surgeries or procedures:  None           Assessment and PLAN   PT/INR done in office per protocol.     INR today is 2.6, therapeutic. Patient has now been on warfarin for nearly 3 weeks.    Plan:  patient's maintenance dose of warfarin has essentially been established. He had started with warfarin 5 mg tablets, and with INR trends is now using 7.5 mg (one and one-half tablets) most days of the week. Due to patient preference to avoid cutting pills so frequently, we will switch to warfarin 7.5 mg tablets after this week. Until patient runs out of 5 mg tablets will continue maintenance dose of warfarin 5 mg on Thursdays and 7.5 mg all other days. When switching to 7.5 mg tablets, patient will take 3.75 mg (one-half of a 7.5 mg tablet) on Thursdays instead of 5 mg. This represents a minimal decrease in total weekly dose so we anticipate INR would remain therapeutic. Using warfarin 5 mg and 7.5 mg tablets.    Recheck INR in 2 week(s).   Warfarin 7.5 mg tablets Rx sent to patient's preferred pharmacy.    Patient verbalized understanding of dosing directions and information discussed. Dosing schedule given to patient. Progress note sent to referring office.  Patient acknowledges working in consult agreement with pharmacist as

## 2025-04-21 ENCOUNTER — HOSPITAL ENCOUNTER (OUTPATIENT)
Dept: SLEEP CENTER | Age: 60
Discharge: HOME | End: 2025-04-23
Payer: COMMERCIAL

## 2025-04-21 DIAGNOSIS — I48.92 ATRIAL FLUTTER, UNSPECIFIED TYPE (HCC): ICD-10-CM

## 2025-04-21 DIAGNOSIS — R06.83 SNORING: ICD-10-CM

## 2025-04-21 PROCEDURE — 95810 POLYSOM 6/> YRS 4/> PARAM: CPT

## 2025-04-22 VITALS
RESPIRATION RATE: 16 BRPM | OXYGEN SATURATION: 94 % | WEIGHT: 209 LBS | HEART RATE: 60 BPM | BODY MASS INDEX: 28.31 KG/M2 | HEIGHT: 72 IN

## 2025-04-22 ASSESSMENT — SLEEP AND FATIGUE QUESTIONNAIRES
HOW LIKELY ARE YOU TO NOD OFF OR FALL ASLEEP WHEN YOU ARE A PASSENGER IN A CAR FOR AN HOUR WITHOUT A BREAK: SLIGHT CHANCE OF DOZING
HOW LIKELY ARE YOU TO NOD OFF OR FALL ASLEEP WHILE SITTING AND READING: WOULD NEVER DOZE
ESS TOTAL SCORE: 8
HOW LIKELY ARE YOU TO NOD OFF OR FALL ASLEEP IN A CAR, WHILE STOPPED FOR A FEW MINUTES IN TRAFFIC: WOULD NEVER DOZE
HOW LIKELY ARE YOU TO NOD OFF OR FALL ASLEEP WHILE LYING DOWN TO REST IN THE AFTERNOON WHEN CIRCUMSTANCES PERMIT: MODERATE CHANCE OF DOZING
HOW LIKELY ARE YOU TO NOD OFF OR FALL ASLEEP WHILE SITTING INACTIVE IN A PUBLIC PLACE: SLIGHT CHANCE OF DOZING
HOW LIKELY ARE YOU TO NOD OFF OR FALL ASLEEP WHILE SITTING QUIETLY AFTER LUNCH WITHOUT ALCOHOL: SLIGHT CHANCE OF DOZING
HOW LIKELY ARE YOU TO NOD OFF OR FALL ASLEEP WHILE SITTING AND TALKING TO SOMEONE: WOULD NEVER DOZE
HOW LIKELY ARE YOU TO NOD OFF OR FALL ASLEEP WHILE WATCHING TV: HIGH CHANCE OF DOZING

## 2025-04-28 NOTE — PROGRESS NOTES
Medication Management Service, Warfarin Management  [facility] Medication Management, [phone]  Visit Date: 4/29/2025   Subjective:   Chin Vergara is a 59 y.o. male who presents to clinic today for anticoagulation monitoring and adjustment.    Patient was referred for warfarin management due to  Indication:   atrial fibrillation.   INR goal: of 2.0-3.0.  Duration of therapy: indefinite.    Patient reports the following:   Adherent with regimen:  somewhat  Missed or extra doses:   patient had an issue picking up order of warfarin 7.5 mg tablets from [facility]. He stated they had run out and kept telling him the tablets would be in the next day and they didn't come in. He missed a warfarin dose Friday 4/18 due to them not having the warfarin 7.5 mg tablets and stating they were unable to give him any of the 5 mg tablets he used to use. He finally transferred this prescription to [facility] and starting 4/19 he took warfarin 7.5 mg daily using 7.5 mg tablets     Bleeding or thromboembolic side effects:  None  Significant medication, dietary, alcohol, or tobacco changes:  None  Significant recent illness, disease state changes, or hospitalization:  None  Upcoming surgeries or procedures:  None           Assessment and PLAN   PT/INR done in office per protocol.     INR today is 2.1, therapeutic.    Plan:  Instructed the patient to continue the current warfarin regimen of 7.5 mg daily.  Using warfarin 7.5 mg tablets. Continuing warfarin 7.5 mg daily which patient has done in the last week and a half.    Recheck INR in 3 week(s).     Patient verbalized understanding of dosing directions and information discussed. Dosing schedule given to patient. Progress note sent to referring office.  Patient acknowledges working in consult agreement with pharmacist as referred by his/her physician.      Electronically signed by Aliza Stephens RPH on 4/28/25 at 3:27 PM EDT    For Pharmacy Admin Tracking

## 2025-04-29 ENCOUNTER — ANTI-COAG VISIT (OUTPATIENT)
Age: 60
End: 2025-04-29
Payer: COMMERCIAL

## 2025-04-29 DIAGNOSIS — I48.4 ATYPICAL ATRIAL FLUTTER (HCC): Primary | ICD-10-CM

## 2025-04-29 LAB
INTERNATIONAL NORMALIZATION RATIO, POC: 2.1
PROTHROMBIN TIME, POC: 25.5

## 2025-04-29 PROCEDURE — 85610 PROTHROMBIN TIME: CPT

## 2025-04-29 PROCEDURE — 99211 OFF/OP EST MAY X REQ PHY/QHP: CPT

## 2025-05-12 ENCOUNTER — PATIENT MESSAGE (OUTPATIENT)
Age: 60
End: 2025-05-12

## 2025-05-12 ENCOUNTER — OFFICE VISIT (OUTPATIENT)
Age: 60
End: 2025-05-12
Payer: COMMERCIAL

## 2025-05-12 DIAGNOSIS — I48.4 ATYPICAL ATRIAL FLUTTER (HCC): Primary | ICD-10-CM

## 2025-05-12 PROCEDURE — 99212 OFFICE O/P EST SF 10 MIN: CPT | Performed by: INTERNAL MEDICINE

## 2025-05-12 PROCEDURE — 93000 ELECTROCARDIOGRAM COMPLETE: CPT | Performed by: INTERNAL MEDICINE

## 2025-05-12 PROCEDURE — 93010 ELECTROCARDIOGRAM REPORT: CPT | Performed by: INTERNAL MEDICINE

## 2025-05-12 NOTE — TELEPHONE ENCOUNTER
Spoke to patient to speak to him about his symptoms he has been having. Patient has checking his pulse with pulse ox (Kardia brand). Patient not sure if he is feeling his heart racing or if he is feeling anxiety. Having patient come in for EKG today to see what type of rhythm he is in. Let patient know we will speak to Dr. Gaytan once EKG is done. Patient understood and will be in later today.

## 2025-05-12 NOTE — TELEPHONE ENCOUNTER
Dr. Gaytan please review EKG done in office today.     Patient has been having symptoms for a couple weeks and over the weekend have been worse.     Just pressure with fluttering and fatigue. Patient feels worse at night starting around 7 at night. Wakes up around 4 in the morning with the fluttering waking him up. Patient has EKG devise at home, says atrial fib (possible) per devise.     Please advise?

## 2025-05-12 NOTE — PROGRESS NOTES
Patient came in today to have a EKG done. Patient has been feeling chest tightness/pressure more at early morning that is waking him up. Patient has a home EKG (alvin) that at times says possible A-Fib when he is having these issues. Patient does feel better after he takes his medications, but at night time after dinner time he starts to feel the fluttering and fatigue setting in. Patient has been having these issues for the last couple weeks and not getting better. Over the weekend it was worse.

## 2025-05-14 NOTE — TELEPHONE ENCOUNTER
Called patient to let him know Dr. Gaytan would like to follow up with Dr. Rasmussen. Patient will call there office to see what they would like to do. Let patient know if he needs anything else to call the office.

## 2025-05-19 NOTE — PROGRESS NOTES
Medication Management Service, Warfarin Management  [facility] Medication Management, [phone]  Visit Date: 2025   Subjective:   Chin Vergara is a 59 y.o. male who presents to clinic today for anticoagulation monitoring and adjustment.    Patient was referred for warfarin management due to  Indication:   atrial fibrillation.   INR goal: of 2.0-3.0.  Duration of therapy: indefinite.    Patient reports the following:   Adherent with regimen:  Yes - patient does report switching warfarin to AM dosing because it is easier for him to be consistent this way    Missed or extra doses:  None   Bleeding or thromboembolic side effects:  None  Significant medication, dietary, alcohol, or tobacco changes:  None    Significant recent illness, disease state changes, or hospitalization:   some episodes of a fib reported to Dr. Gaytan's office, per patient he has not had any episodes since then    Upcoming surgeries or procedures:  None           Assessment and PLAN   PT/INR done in office per protocol.     INR today is 2.8, therapeutic.    Plan:  Instructed the patient to continue the current warfarin regimen of 7.5 mg daily.  Using warfarin 7.5 mg tablets.    Recheck INR in 4 week(s).     Patient verbalized understanding of dosing directions and information discussed. Dosing schedule given to patient. Progress note sent to referring office.  Patient acknowledges working in consult agreement with pharmacist as referred by his/her physician.      Electronically signed by Aliza Stephens RPH on 25 at 3:02 PM EDT    For Pharmacy Admin Tracking Only    Intervention Detail: Adherence Monitorin  Total # of Interventions Recommended: 1  Total # of Interventions Accepted: 1  Time Spent (min): 20

## 2025-05-20 ENCOUNTER — ANTI-COAG VISIT (OUTPATIENT)
Age: 60
End: 2025-05-20
Payer: COMMERCIAL

## 2025-05-20 DIAGNOSIS — I48.4 ATYPICAL ATRIAL FLUTTER (HCC): Primary | ICD-10-CM

## 2025-05-20 LAB
INTERNATIONAL NORMALIZATION RATIO, POC: 2.8
PROTHROMBIN TIME, POC: 33.5

## 2025-05-20 PROCEDURE — 99211 OFF/OP EST MAY X REQ PHY/QHP: CPT

## 2025-05-20 PROCEDURE — 85610 PROTHROMBIN TIME: CPT

## 2025-06-16 ENCOUNTER — ANTI-COAG VISIT (OUTPATIENT)
Age: 60
End: 2025-06-16
Payer: COMMERCIAL

## 2025-06-16 DIAGNOSIS — I48.4 ATYPICAL ATRIAL FLUTTER (HCC): Primary | ICD-10-CM

## 2025-06-16 LAB
INTERNATIONAL NORMALIZATION RATIO, POC: 4.6
PROTHROMBIN TIME, POC: 55.7

## 2025-06-16 PROCEDURE — 85610 PROTHROMBIN TIME: CPT

## 2025-06-16 PROCEDURE — 99212 OFFICE O/P EST SF 10 MIN: CPT

## 2025-06-16 NOTE — PROGRESS NOTES
Medication Management Service, Warfarin Management  [facility] Medication Management, [phone]  Visit Date: 25  Subjective:   Chin Vergara is a 59 y.o. male who presents to clinic today for anticoagulation monitoring and adjustment.     Patient was referred for warfarin management due to  Indication:   atrial fibrillation.   INR goal: of 2.0-3.0.  Duration of therapy: indefinite.     Patient reports the following:   Adherent with regimen:  Yes - patient does report switching warfarin to AM dosing because it is easier for him to be consistent this way     Missed or extra doses:  None   Bleeding or thromboembolic side effects:  None  Significant medication, dietary, alcohol, or tobacco changes:  Yes--patient drank an entire pitcher of beer over the weekend (Father's Day). He forgot this could have an effect on  his INR. Usually when he drinks alcohol, it's only 2-3 cups.      Significant recent illness, disease state changes, or hospitalization:  some episodes of a fib reported to Dr. Gaytan's office, per patient he has not had any episodes since then     Upcoming surgeries or procedures:  None           Assessment and PLAN   PT/INR done in office per protocol.     INR today is 4.6, supratherapeutic.     Plan: Patient already took his warfarin dose today. Instructed patient to hold tomorrow's dose, then resume normal dosing.     Recheck INR in 2 week(s).      Patient verbalized understanding of dosing directions and information discussed. Dosing schedule given to patient. Progress note sent to referring office.  Patient acknowledges working in consult agreement with pharmacist as referred by his/her physician.       Electronically signed by Stefan Valle RPH on 25 at 8:03AM EDT     For Pharmacy Admin Tracking Only     Intervention Detail: Adherence Monitorin  Total # of Interventions Recommended: 1  Total # of Interventions Accepted: 1  Time Spent (min): 20

## 2025-06-25 ENCOUNTER — OFFICE VISIT (OUTPATIENT)
Age: 60
End: 2025-06-25
Payer: COMMERCIAL

## 2025-06-25 VITALS
RESPIRATION RATE: 16 BRPM | HEART RATE: 63 BPM | SYSTOLIC BLOOD PRESSURE: 125 MMHG | DIASTOLIC BLOOD PRESSURE: 74 MMHG | BODY MASS INDEX: 28.61 KG/M2 | TEMPERATURE: 98 F | WEIGHT: 211.2 LBS | HEIGHT: 72 IN

## 2025-06-25 DIAGNOSIS — Z12.5 PROSTATE CANCER SCREENING: ICD-10-CM

## 2025-06-25 DIAGNOSIS — I48.92 ATRIAL FLUTTER, UNSPECIFIED TYPE (HCC): Primary | ICD-10-CM

## 2025-06-25 DIAGNOSIS — Z78.9 NONSMOKER: ICD-10-CM

## 2025-06-25 PROCEDURE — 3078F DIAST BP <80 MM HG: CPT | Performed by: FAMILY MEDICINE

## 2025-06-25 PROCEDURE — 99203 OFFICE O/P NEW LOW 30 MIN: CPT | Performed by: FAMILY MEDICINE

## 2025-06-25 PROCEDURE — 3074F SYST BP LT 130 MM HG: CPT | Performed by: FAMILY MEDICINE

## 2025-06-25 SDOH — ECONOMIC STABILITY: FOOD INSECURITY: WITHIN THE PAST 12 MONTHS, THE FOOD YOU BOUGHT JUST DIDN'T LAST AND YOU DIDN'T HAVE MONEY TO GET MORE.: NEVER TRUE

## 2025-06-25 SDOH — SOCIAL STABILITY: SOCIAL NETWORK: HOW OFTEN DO YOU ATTEND CHURCH OR RELIGIOUS SERVICES?: NEVER

## 2025-06-25 SDOH — HEALTH STABILITY: MENTAL HEALTH: HOW OFTEN DO YOU HAVE A DRINK CONTAINING ALCOHOL?: 2-3 TIMES A WEEK

## 2025-06-25 SDOH — SOCIAL STABILITY: SOCIAL INSECURITY: WITHIN THE LAST YEAR, HAVE YOU BEEN HUMILIATED OR EMOTIONALLY ABUSED IN OTHER WAYS BY YOUR PARTNER OR EX-PARTNER?: NO

## 2025-06-25 SDOH — SOCIAL STABILITY: SOCIAL NETWORK: HOW OFTEN DO YOU GET TOGETHER WITH FRIENDS OR RELATIVES?: MORE THAN THREE TIMES A WEEK

## 2025-06-25 SDOH — SOCIAL STABILITY: SOCIAL NETWORK: HOW OFTEN DO YOU ATTEND MEETINGS OF THE CLUBS OR ORGANIZATIONS YOU BELONG TO?: NEVER

## 2025-06-25 SDOH — SOCIAL STABILITY: SOCIAL INSECURITY: ARE YOU MARRIED, WIDOWED, DIVORCED, SEPARATED, NEVER MARRIED, OR LIVING WITH A PARTNER?: MARRIED

## 2025-06-25 SDOH — SOCIAL STABILITY: SOCIAL INSECURITY
WITHIN THE LAST YEAR, HAVE YOU BEEN KICKED, HIT, SLAPPED, OR OTHERWISE PHYSICALLY HURT BY YOUR PARTNER OR EX-PARTNER?: NO

## 2025-06-25 SDOH — SOCIAL STABILITY: SOCIAL INSECURITY: WITHIN THE LAST YEAR, HAVE YOU BEEN AFRAID OF YOUR PARTNER OR EX-PARTNER?: NO

## 2025-06-25 SDOH — SOCIAL STABILITY: SOCIAL INSECURITY
WITHIN THE LAST YEAR, HAVE YOU BEEN RAPED OR FORCED TO HAVE ANY KIND OF SEXUAL ACTIVITY BY YOUR PARTNER OR EX-PARTNER?: NO

## 2025-06-25 SDOH — HEALTH STABILITY: PHYSICAL HEALTH: ON AVERAGE, HOW MANY MINUTES DO YOU ENGAGE IN EXERCISE AT THIS LEVEL?: 10 MIN

## 2025-06-25 SDOH — SOCIAL STABILITY: SOCIAL NETWORK
DO YOU BELONG TO ANY CLUBS OR ORGANIZATIONS SUCH AS CHURCH GROUPS, UNIONS, FRATERNAL OR ATHLETIC GROUPS, OR SCHOOL GROUPS?: NO

## 2025-06-25 SDOH — SOCIAL STABILITY: SOCIAL NETWORK
IN A TYPICAL WEEK, HOW MANY TIMES DO YOU TALK ON THE PHONE WITH FAMILY, FRIENDS, OR NEIGHBORS?: MORE THAN THREE TIMES A WEEK

## 2025-06-25 SDOH — ECONOMIC STABILITY: TRANSPORTATION INSECURITY: IN THE PAST 12 MONTHS, HAS LACK OF TRANSPORTATION KEPT YOU FROM MEDICAL APPOINTMENTS OR FROM GETTING MEDICATIONS?: NO

## 2025-06-25 SDOH — ECONOMIC STABILITY: FOOD INSECURITY: WITHIN THE PAST 12 MONTHS, YOU WORRIED THAT YOUR FOOD WOULD RUN OUT BEFORE YOU GOT THE MONEY TO BUY MORE.: NEVER TRUE

## 2025-06-25 SDOH — HEALTH STABILITY: MENTAL HEALTH: HOW MANY DRINKS CONTAINING ALCOHOL DO YOU HAVE ON A TYPICAL DAY WHEN YOU ARE DRINKING?: 3 OR 4

## 2025-06-25 SDOH — ECONOMIC STABILITY: HOUSING INSECURITY: IN THE LAST 12 MONTHS, WAS THERE A TIME WHEN YOU WERE NOT ABLE TO PAY THE MORTGAGE OR RENT ON TIME?: NO

## 2025-06-25 SDOH — ECONOMIC STABILITY: FOOD INSECURITY: HOW HARD IS IT FOR YOU TO PAY FOR THE VERY BASICS LIKE FOOD, HOUSING, MEDICAL CARE, AND HEATING?: NOT HARD AT ALL

## 2025-06-25 SDOH — HEALTH STABILITY: PHYSICAL HEALTH: ON AVERAGE, HOW MANY DAYS PER WEEK DO YOU ENGAGE IN MODERATE TO STRENUOUS EXERCISE (LIKE A BRISK WALK)?: 2 DAYS

## 2025-06-25 ASSESSMENT — PATIENT HEALTH QUESTIONNAIRE - PHQ9
SUM OF ALL RESPONSES TO PHQ QUESTIONS 1-9: 0
2. FEELING DOWN, DEPRESSED OR HOPELESS: NOT AT ALL
1. LITTLE INTEREST OR PLEASURE IN DOING THINGS: NOT AT ALL
SUM OF ALL RESPONSES TO PHQ QUESTIONS 1-9: 0

## 2025-06-25 ASSESSMENT — ACTIVITIES OF DAILY LIVING (ADL): LACK_OF_TRANSPORTATION: NO

## 2025-06-25 NOTE — PATIENT INSTRUCTIONS
1. Atrial flutter, unspecified type (HCC)  Comments:  Currently in sinus rhythm. Will discuss with Dr. Gaytan possibility of changing to xarelto from warfarin.  Orders:  -     Lipid Panel; Future  -     Comprehensive Metabolic Panel; Future  2. Prostate cancer screening  -     PSA Screening; Future  3. Nonsmoker

## 2025-06-30 ENCOUNTER — HOSPITAL ENCOUNTER (OUTPATIENT)
Facility: CLINIC | Age: 60
Discharge: HOME | End: 2025-06-30
Payer: COMMERCIAL

## 2025-06-30 DIAGNOSIS — Z12.5 PROSTATE CANCER SCREENING: ICD-10-CM

## 2025-06-30 DIAGNOSIS — I48.92 ATRIAL FLUTTER, UNSPECIFIED TYPE (HCC): ICD-10-CM

## 2025-06-30 LAB
ALBUMIN SERPL-MCNC: 4.5 G/DL (ref 3.5–5.2)
ALBUMIN/GLOB SERPL: 1.9 {RATIO} (ref 1–2.5)
ALP SERPL-CCNC: 57 U/L (ref 40–129)
ALT SERPL-CCNC: 28 U/L (ref 10–50)
ANION GAP SERPL CALCULATED.3IONS-SCNC: 12 MMOL/L (ref 9–16)
AST SERPL-CCNC: 28 U/L (ref 10–50)
BILIRUB SERPL-MCNC: 0.4 MG/DL (ref 0–1.2)
BUN SERPL-MCNC: 11 MG/DL (ref 8–23)
CALCIUM SERPL-MCNC: 9.1 MG/DL (ref 8.6–10.4)
CHLORIDE SERPL-SCNC: 109 MMOL/L (ref 98–107)
CHOLEST SERPL-MCNC: 190 MG/DL (ref 0–199)
CHOLESTEROL/HDL RATIO: 3.2
CO2 SERPL-SCNC: 24 MMOL/L (ref 20–31)
CREAT SERPL-MCNC: 1.2 MG/DL (ref 0.7–1.2)
GFR, ESTIMATED: 69 ML/MIN/1.73M2
GLUCOSE SERPL-MCNC: 101 MG/DL (ref 74–99)
HDLC SERPL-MCNC: 59 MG/DL
LDLC SERPL CALC-MCNC: 108 MG/DL (ref 0–100)
POTASSIUM SERPL-SCNC: 4.6 MMOL/L (ref 3.7–5.3)
PROT SERPL-MCNC: 6.9 G/DL (ref 6.6–8.7)
PSA SERPL-MCNC: 0.74 NG/ML (ref 0–4)
SODIUM SERPL-SCNC: 145 MMOL/L (ref 136–145)
TRIGL SERPL-MCNC: 115 MG/DL
VLDLC SERPL CALC-MCNC: 23 MG/DL (ref 1–30)

## 2025-06-30 PROCEDURE — G0103 PSA SCREENING: HCPCS

## 2025-06-30 PROCEDURE — 36415 COLL VENOUS BLD VENIPUNCTURE: CPT

## 2025-06-30 PROCEDURE — 80061 LIPID PANEL: CPT

## 2025-06-30 PROCEDURE — 80053 COMPREHEN METABOLIC PANEL: CPT

## 2025-07-01 ENCOUNTER — TELEPHONE (OUTPATIENT)
Age: 60
End: 2025-07-01

## 2025-07-01 ENCOUNTER — ANTI-COAG VISIT (OUTPATIENT)
Age: 60
End: 2025-07-01
Payer: COMMERCIAL

## 2025-07-01 DIAGNOSIS — I48.92 ATRIAL FLUTTER, UNSPECIFIED TYPE (HCC): Primary | ICD-10-CM

## 2025-07-01 LAB
INTERNATIONAL NORMALIZATION RATIO, POC: 1.9
PROTHROMBIN TIME, POC: 22.6

## 2025-07-01 PROCEDURE — 85610 PROTHROMBIN TIME: CPT

## 2025-07-01 PROCEDURE — 99211 OFF/OP EST MAY X REQ PHY/QHP: CPT

## 2025-07-01 NOTE — TELEPHONE ENCOUNTER
PT is calling to switch to the generic brand of Xarelto.  PT states that he had trouble with Eliquis and was switched to Xarelto.  The medication was too expensive and then switched to Coumadin.   PT states that he became aware  of a generic for Xarelto and would like to try that.  He would rather try the generic so he does not have to go through all the test for coumadin.    Please call PT about switching

## 2025-07-01 NOTE — PROGRESS NOTES
Medication Management Service, Warfarin Management  [facility] Medication Management, [phone]  Visit Date: 2025   Subjective:   Chin Vergara is a 59 y.o. male who presents to clinic today for anticoagulation monitoring and adjustment.    Patient was referred for warfarin management due to  Indication:   atrial fibrillation.   INR goal: of 2.0-3.0.  Duration of therapy: indefinite.    Patient reports the following:   Adherent with regimen:  Yes  Missed or extra doses:  likely missed dose 2 days ago but is not 100% sure if he missed it or not    Bleeding or thromboembolic side effects:  None    Significant medication, dietary, alcohol, or tobacco changes:  baseline diet, baseline alcohol intake    Significant recent illness, disease state changes, or hospitalization:  None  Upcoming surgeries or procedures:  None           Assessment and PLAN   PT/INR done in office per protocol.     INR today is 1.9, subtherapeutic, in setting of likely missed dose 2 days ago    Plan:  Instructed the patient to continue the current warfarin regimen of 7.5 mg daily.  Using warfarin 7.5 mg tablets. Patient is going to call Dr. Gaytan's office because he would like to try going back on Xarelto. If he does obtain Xarelto tablets, advised patient it is ok to stop warfarin and resume Xarelto as long as INR less than 3. He will let clinic know if he is switching.    Recheck INR in 2 week(s).     Patient verbalized understanding of dosing directions and information discussed. Dosing schedule given to patient. Progress note sent to referring office.  Patient acknowledges working in consult agreement with pharmacist as referred by his/her physician.      Electronically signed by Aliza Stephens RPH on 25 at 7:25 AM EDT    For Pharmacy Admin Tracking Only    Intervention Detail: Adherence Monitorin  Total # of Interventions Recommended: 1  Total # of Interventions Accepted: 1  Time Spent (min): 20

## 2025-07-01 NOTE — TELEPHONE ENCOUNTER
Left voicemail . Asking patient to check with insurance that Xarelto (RIvaroxaban) is cover since Rx was prescribe 12/23/2024

## 2025-07-03 ENCOUNTER — RESULTS FOLLOW-UP (OUTPATIENT)
Age: 60
End: 2025-07-03

## 2025-07-03 DIAGNOSIS — R73.01 IMPAIRED FASTING GLUCOSE: Primary | ICD-10-CM

## 2025-07-03 RX ORDER — RIVAROXABAN 10 MG/1
10 TABLET, FILM COATED ORAL
Qty: 90 TABLET | Refills: 1 | Status: SHIPPED | OUTPATIENT
Start: 2025-07-03

## 2025-07-07 ENCOUNTER — TELEPHONE (OUTPATIENT)
Age: 60
End: 2025-07-07

## 2025-07-07 NOTE — TELEPHONE ENCOUNTER
[facility] Medication Management Clinic Note  Patient cancelled next INR check. He is switching to Xarelto. He plans to  and start it today. INR last week was 1.9. Closed anticoagulation episode and referral for warfarin management.    Aliza Balbuena, PharmD  [facility]  7/7/2025 9:58 AM

## 2025-07-09 ENCOUNTER — HOSPITAL ENCOUNTER (OUTPATIENT)
Age: 60
Setting detail: SPECIMEN
Discharge: HOME | End: 2025-07-09

## 2025-07-09 ENCOUNTER — OFFICE VISIT (OUTPATIENT)
Age: 60
End: 2025-07-09
Payer: COMMERCIAL

## 2025-07-09 VITALS
BODY MASS INDEX: 28.91 KG/M2 | TEMPERATURE: 97.3 F | DIASTOLIC BLOOD PRESSURE: 62 MMHG | WEIGHT: 213.4 LBS | HEART RATE: 57 BPM | SYSTOLIC BLOOD PRESSURE: 114 MMHG | HEIGHT: 72 IN

## 2025-07-09 DIAGNOSIS — R73.01 IMPAIRED FASTING GLUCOSE: ICD-10-CM

## 2025-07-09 DIAGNOSIS — Z11.59 NEED FOR HEPATITIS C SCREENING TEST: ICD-10-CM

## 2025-07-09 DIAGNOSIS — J01.90 ACUTE BACTERIAL SINUSITIS: ICD-10-CM

## 2025-07-09 DIAGNOSIS — Z00.00 WELL ADULT EXAM: Primary | ICD-10-CM

## 2025-07-09 DIAGNOSIS — Z11.4 SCREENING FOR HIV (HUMAN IMMUNODEFICIENCY VIRUS): ICD-10-CM

## 2025-07-09 DIAGNOSIS — B96.89 ACUTE BACTERIAL SINUSITIS: ICD-10-CM

## 2025-07-09 DIAGNOSIS — Z78.9 NONSMOKER: ICD-10-CM

## 2025-07-09 LAB
EST. AVERAGE GLUCOSE BLD GHB EST-MCNC: 100 MG/DL
HBA1C MFR BLD: 5.1 % (ref 4–6)
HCV AB SERPL QL IA: NONREACTIVE
HIV 1+2 AB+HIV1 P24 AG SERPL QL IA: NONREACTIVE

## 2025-07-09 PROCEDURE — 3078F DIAST BP <80 MM HG: CPT

## 2025-07-09 PROCEDURE — 99396 PREV VISIT EST AGE 40-64: CPT

## 2025-07-09 PROCEDURE — 3074F SYST BP LT 130 MM HG: CPT

## 2025-07-09 NOTE — PROGRESS NOTES
[facility]  [address] [place] [address]  Phone: [phone]  Fax: [phone]      CHIEF COMPLAINT:     Chin Vergara is a 59 y.o. male for a complete physical exam.    HPI     Subjective:  Patient has been doing well with only concern being sinus pressure. He has been experiencing clear drainage, sinus pressure, cough, no fevers, wheezing. Been using mucinex, benedryl, none of those helped. Gets worse when lays down. Two weeks ago and it with his nephew. Initially got better before symptoms reappeared.     He denies any concerns for depression or anxiety although Daughter with drug issues that effects him. That is adding stress to his life. Despite this he feels like he is doing ok. He eats a well balanced diet. Going to the gym less. Still walks but doesn't go to the gym. He is planning on restarting his regular gym workouts. He is  sexually active and would not like STI testing today.       REVIEWED INFORMATION      Past Medical History:   Diagnosis Date    Arrhythmia     Arthritis     Asthma 1995    Exercise induce    Atrial flutter (HCC)     Bronchitis     H/O ETOH abuse     Pneumonia      Past Surgical History:   Procedure Laterality Date    BACK SURGERY      Fusion C3-C5 12 years ago per patient. 06.25.2025.     CARDIOVERSION  07/15/2021    CARDIOVERSION N/A 02/23/2024    DR. GU    CARPAL TUNNEL RELEASE Right     06/2025    COLONOSCOPY      EP DEVICE PROCEDURE N/A 03/12/2024    Grady / Ablation A-flutter (no anes) / carto performed by Durga Kuhn MD at [facility] CARDIAC CATH LAB    EP STUDY/ABLATION N/A 03/12/2024    A-FLUTTER ABLATION    DR. KUHN    HYDROCELE EXCISION      KNEE SURGERY Bilateral     26, 27 of age. removed cartilage    NECK SURGERY      PTCA      TRANSESOPHAGEAL ECHOCARDIOGRAM  07/15/2021    WISDOM TOOTH EXTRACTION       Eliquis [apixaban] and Levaquin [levofloxacin in d5w]  Social History     Tobacco

## 2025-07-09 NOTE — PATIENT INSTRUCTIONS
Thank you for following up with us at [facility] outpatient residency clinic! It was a pleasure to meet you today!     Our plan is the following:  -Talk to your cardiologist about vaccinations I do recommend the RSV, Shingrix, influenza, and COVID  -I am going to be prescribing you an antibiotic for your sinus infection called Augmentin.  Please call us if does not get better in the next week  - I did order one-time HIV and hep C screening as well as you do have a A1c ordered these do not have to be done fasting and can be done anytime    If you have any additional questions or concerns, please call the office ([phone]) and speak to one of the staff. They will triage and forward the message to the doctors! Have a great rest of your day!

## 2025-07-24 ENCOUNTER — TELEPHONE (OUTPATIENT)
Age: 60
End: 2025-07-24

## 2025-07-25 ENCOUNTER — PATIENT MESSAGE (OUTPATIENT)
Age: 60
End: 2025-07-25

## 2025-07-29 NOTE — TELEPHONE ENCOUNTER
Vishnu Neely, thank you for reaching out. Your symptoms don't seem as if they are cardiac related. I see that you have a scheduled appointment with your primary care provider. Let us know if they suggest a cardiac follow-up and we can schedule an appointment. Hope you get to feeling better.

## 2025-07-31 NOTE — PROGRESS NOTES
[facility]  [address] [place] [address]  Phone: [phone]  Fax: [phone]      Date of Visit: 2025   Patient Name: Chin Vergara   Patient :  1965     SUBJECTIVE   HPI: Chin Vergara is a 59 y.o. male with atrial flutter, hypertension, asthma (exercise-induced) presenting today with   Chief Complaint   Patient presents with    Congestion    Drooling     It has improve, but every 2 hrs he started getting a lot of drooling. KpeavyRMA      He has been drooling since he had C3-C5 fusion about 10 years ago with an anterior approach, and it has been becoming more frequent. The drooling worsened after his last physical when he had cold-like symptoms. The drooling was so severe that his shirt would be wet while talking on the phone, and this not normal for him despite having a small amount of drooling since the surgery. He has not had issues with talking or swallowing, and he has no pain when he swallows. He denies neck pain, radicular pain into the extremities, numbness, tingling, and weakness. He has had some shortness of breath with the heat but no chest pain. He also has had no nausea, vomiting, or fever.    REVIEW OF SYSTEMS    As noted above in HPI and as follows:    Review of Systems   Constitutional:  Negative for chills, fatigue and fever.   HENT:  Positive for congestion and drooling. Negative for sore throat, trouble swallowing and voice change.    Eyes:  Negative for photophobia, pain and visual disturbance.   Respiratory:  Negative for cough, choking, shortness of breath and wheezing.    Cardiovascular:  Negative for chest pain and palpitations.   Gastrointestinal:  Negative for abdominal pain, constipation, diarrhea and nausea.   Genitourinary:  Negative for difficulty urinating, frequency and urgency.   Musculoskeletal:  Negative for gait problem, neck pain and neck stiffness.   Neurological:  Negative for dizziness,

## 2025-08-01 ENCOUNTER — OFFICE VISIT (OUTPATIENT)
Age: 60
End: 2025-08-01

## 2025-08-01 VITALS
TEMPERATURE: 97.4 F | SYSTOLIC BLOOD PRESSURE: 127 MMHG | HEART RATE: 69 BPM | WEIGHT: 215.6 LBS | BODY MASS INDEX: 29.2 KG/M2 | HEIGHT: 72 IN | DIASTOLIC BLOOD PRESSURE: 82 MMHG | RESPIRATION RATE: 16 BRPM

## 2025-08-01 DIAGNOSIS — Z98.1 HISTORY OF FUSION OF CERVICAL SPINE: ICD-10-CM

## 2025-08-01 DIAGNOSIS — K11.7 DROOLING: Primary | ICD-10-CM

## 2025-08-01 RX ORDER — WARFARIN SODIUM 7.5 MG/1
7.5 TABLET ORAL DAILY
COMMUNITY

## 2025-08-01 ASSESSMENT — ENCOUNTER SYMPTOMS
TROUBLE SWALLOWING: 0
DIARRHEA: 0
EYE PAIN: 0
COUGH: 0
NAUSEA: 0
WHEEZING: 0
CHOKING: 0
ABDOMINAL PAIN: 0
CONSTIPATION: 0
VOICE CHANGE: 0
SHORTNESS OF BREATH: 0
SORE THROAT: 0
PHOTOPHOBIA: 0

## 2025-08-01 NOTE — PATIENT INSTRUCTIONS
Thank you for following up with us at [facility] office. It was a pleasure to meet you!    Our plan is the following:  As your drooling has worsened a little, I would like to get some xrays of your cervical spine to make sure that your hardware is still in place.  I would also like you to schedule a follow-up appointment with your surgeon.  Your medication list is included in the after visit summary. If you find any differences when compared to your medications at home, please contact the office ([phone]) to let us know.  You can also call the office if you have any additional questions or concerns and speak to one of the staff. They will triage and forward the message to the provider.   We will plan to follow-up in 4 weeks for drooling.  Have a great weekend!

## 2025-08-07 ENCOUNTER — ANTI-COAG VISIT (OUTPATIENT)
Age: 60
End: 2025-08-07
Payer: COMMERCIAL

## 2025-08-07 DIAGNOSIS — I48.91 ATRIAL FIBRILLATION, UNSPECIFIED TYPE (HCC): Primary | ICD-10-CM

## 2025-08-07 LAB
INTERNATIONAL NORMALIZATION RATIO, POC: 3.3
PROTHROMBIN TIME, POC: 39.8

## 2025-08-07 PROCEDURE — 85610 PROTHROMBIN TIME: CPT

## 2025-08-07 PROCEDURE — 99213 OFFICE O/P EST LOW 20 MIN: CPT

## 2025-08-18 ENCOUNTER — OFFICE VISIT (OUTPATIENT)
Age: 60
End: 2025-08-18
Payer: COMMERCIAL

## 2025-08-18 VITALS
TEMPERATURE: 98 F | HEART RATE: 61 BPM | BODY MASS INDEX: 28.99 KG/M2 | RESPIRATION RATE: 16 BRPM | SYSTOLIC BLOOD PRESSURE: 130 MMHG | DIASTOLIC BLOOD PRESSURE: 68 MMHG | HEIGHT: 72 IN | WEIGHT: 214 LBS

## 2025-08-18 DIAGNOSIS — R10.32 LEFT LOWER QUADRANT ABDOMINAL PAIN: Primary | ICD-10-CM

## 2025-08-18 DIAGNOSIS — R23.3 ABNORMAL BRUISING: ICD-10-CM

## 2025-08-18 DIAGNOSIS — R14.0 ABDOMINAL BLOATING: ICD-10-CM

## 2025-08-18 PROCEDURE — 3078F DIAST BP <80 MM HG: CPT

## 2025-08-18 PROCEDURE — 99214 OFFICE O/P EST MOD 30 MIN: CPT

## 2025-08-18 PROCEDURE — 3075F SYST BP GE 130 - 139MM HG: CPT

## 2025-08-21 ENCOUNTER — ANTI-COAG VISIT (OUTPATIENT)
Age: 60
End: 2025-08-21
Payer: COMMERCIAL

## 2025-08-21 ENCOUNTER — HOSPITAL ENCOUNTER (OUTPATIENT)
Dept: GENERAL RADIOLOGY | Age: 60
Discharge: HOME | End: 2025-08-23
Payer: COMMERCIAL

## 2025-08-21 DIAGNOSIS — Z98.1 HISTORY OF FUSION OF CERVICAL SPINE: ICD-10-CM

## 2025-08-21 DIAGNOSIS — I48.91 ATRIAL FIBRILLATION, UNSPECIFIED TYPE (HCC): Primary | ICD-10-CM

## 2025-08-21 DIAGNOSIS — K11.7 DROOLING: ICD-10-CM

## 2025-08-21 LAB
INTERNATIONAL NORMALIZATION RATIO, POC: 3.7
PROTHROMBIN TIME, POC: 44.6

## 2025-08-21 PROCEDURE — 85610 PROTHROMBIN TIME: CPT

## 2025-08-21 PROCEDURE — 72040 X-RAY EXAM NECK SPINE 2-3 VW: CPT

## 2025-08-21 PROCEDURE — 99213 OFFICE O/P EST LOW 20 MIN: CPT

## 2025-08-25 ENCOUNTER — HOSPITAL ENCOUNTER (OUTPATIENT)
Dept: CT IMAGING | Facility: CLINIC | Age: 60
Discharge: HOME | End: 2025-08-27
Payer: COMMERCIAL

## 2025-08-25 DIAGNOSIS — R10.32 LEFT LOWER QUADRANT ABDOMINAL PAIN: ICD-10-CM

## 2025-08-25 DIAGNOSIS — R14.0 ABDOMINAL BLOATING: ICD-10-CM

## 2025-08-25 LAB
CREAT SERPL-MCNC: 1.1 MG/DL (ref 0.7–1.2)
GFR, ESTIMATED: 76 ML/MIN/1.73M2

## 2025-08-25 PROCEDURE — 82565 ASSAY OF CREATININE: CPT

## 2025-08-25 PROCEDURE — 36415 COLL VENOUS BLD VENIPUNCTURE: CPT

## 2025-08-25 PROCEDURE — 74177 CT ABD & PELVIS W/CONTRAST: CPT

## 2025-08-25 PROCEDURE — 2500000003 HC RX 250 WO HCPCS

## 2025-08-25 PROCEDURE — 6360000004 HC RX CONTRAST MEDICATION

## 2025-08-25 RX ORDER — SODIUM CHLORIDE 0.9 % (FLUSH) 0.9 %
10 SYRINGE (ML) INJECTION PRN
Status: DISCONTINUED | OUTPATIENT
Start: 2025-08-25 | End: 2025-08-28 | Stop reason: HOSPADM

## 2025-08-25 RX ORDER — IOPAMIDOL 755 MG/ML
75 INJECTION, SOLUTION INTRAVASCULAR
Status: COMPLETED | OUTPATIENT
Start: 2025-08-25 | End: 2025-08-25

## 2025-08-25 RX ORDER — 0.9 % SODIUM CHLORIDE 0.9 %
70 INTRAVENOUS SOLUTION INTRAVENOUS ONCE
Status: DISCONTINUED | OUTPATIENT
Start: 2025-08-25 | End: 2025-08-28 | Stop reason: HOSPADM

## 2025-08-25 RX ADMIN — SODIUM CHLORIDE, PRESERVATIVE FREE 10 ML: 5 INJECTION INTRAVENOUS at 09:02

## 2025-08-25 RX ADMIN — IOPAMIDOL 75 ML: 755 INJECTION, SOLUTION INTRAVENOUS at 09:02

## 2025-08-25 RX ADMIN — Medication 70 ML: at 09:02

## 2025-08-25 RX ADMIN — BARIUM SULFATE 450 ML: 20 SUSPENSION ORAL at 09:02

## 2025-09-03 ENCOUNTER — ANTI-COAG VISIT (OUTPATIENT)
Age: 60
End: 2025-09-03
Payer: COMMERCIAL

## 2025-09-03 DIAGNOSIS — I48.91 ATRIAL FIBRILLATION, UNSPECIFIED TYPE (HCC): Primary | ICD-10-CM

## 2025-09-03 LAB
INTERNATIONAL NORMALIZATION RATIO, POC: 2.2
PROTHROMBIN TIME, POC: 26.8

## 2025-09-03 PROCEDURE — 85610 PROTHROMBIN TIME: CPT

## 2025-09-03 PROCEDURE — 99212 OFFICE O/P EST SF 10 MIN: CPT

## 2025-09-05 ENCOUNTER — OFFICE VISIT (OUTPATIENT)
Age: 60
End: 2025-09-05
Payer: COMMERCIAL

## 2025-09-05 VITALS
HEIGHT: 72 IN | BODY MASS INDEX: 29.66 KG/M2 | SYSTOLIC BLOOD PRESSURE: 110 MMHG | DIASTOLIC BLOOD PRESSURE: 65 MMHG | WEIGHT: 219 LBS | HEART RATE: 60 BPM | OXYGEN SATURATION: 98 %

## 2025-09-05 DIAGNOSIS — R06.02 SHORTNESS OF BREATH: ICD-10-CM

## 2025-09-05 DIAGNOSIS — I10 PRIMARY HYPERTENSION: ICD-10-CM

## 2025-09-05 DIAGNOSIS — I48.92 ATRIAL FLUTTER, UNSPECIFIED TYPE (HCC): Primary | ICD-10-CM

## 2025-09-05 DIAGNOSIS — Z98.890 H/O CARDIAC RADIOFREQUENCY ABLATION: ICD-10-CM

## 2025-09-05 PROCEDURE — 3078F DIAST BP <80 MM HG: CPT

## 2025-09-05 PROCEDURE — 99214 OFFICE O/P EST MOD 30 MIN: CPT

## 2025-09-05 PROCEDURE — 93000 ELECTROCARDIOGRAM COMPLETE: CPT

## 2025-09-05 PROCEDURE — 3074F SYST BP LT 130 MM HG: CPT

## 2025-09-05 ASSESSMENT — ENCOUNTER SYMPTOMS
SHORTNESS OF BREATH: 1
CHEST TIGHTNESS: 0

## (undated) DEVICE — CATHETER ABLAT 8FR L115CM 1-6-2MM SPC TIP 3.5MM DF CRV

## (undated) DEVICE — PROVE COVER: Brand: UNBRANDED

## (undated) DEVICE — CATHETER US 8FR L90CM FOR SIEMENS SEQUOIA CYPRESS ACUSON

## (undated) DEVICE — PATCH REF EXT FOR CARTO 3 SYS (EA = 6 PACKS)

## (undated) DEVICE — CATHETER EP LG 2-8-2 MM 7 FRX95 CM LIVEWIRE

## (undated) DEVICE — DRAPE EP LT SUBCLAV ENTRY SHLD SORBX

## (undated) DEVICE — STRAP ARMBRD W1.5XL32IN FOAM STR YET SFT W/ HK AND LOOP

## (undated) DEVICE — TUBING PMP FOR CARTO SYS SMARTABLATE

## (undated) DEVICE — INTRODUCER SHTH STD 8 FRX11 CM FLX KINK RESIST CANN AVNT +

## (undated) DEVICE — INTRODUCER SHTH AD L11CM DIA 9FR STD BLK S STL PERIPH BRACH

## (undated) DEVICE — STERILE (15.2 TAPERED TO 7.6 X 183CM) POLYETHYLENE ACCORDION-FOLDED COVER FOR USE WITH SIEMENS ACUNAV ULTRASOUND CATHETER FAMILY CONNECTOR: Brand: SWIFTLINK TRANSDUCER COVER

## (undated) DEVICE — BAG,BANDED,W/RUBBERBAND,STERILE,30X36: Brand: MEDLINE